# Patient Record
Sex: FEMALE | Race: WHITE | NOT HISPANIC OR LATINO | Employment: FULL TIME | URBAN - METROPOLITAN AREA
[De-identification: names, ages, dates, MRNs, and addresses within clinical notes are randomized per-mention and may not be internally consistent; named-entity substitution may affect disease eponyms.]

---

## 2017-09-29 ENCOUNTER — OFFICE VISIT (OUTPATIENT)
Dept: OBSTETRICS AND GYNECOLOGY | Facility: CLINIC | Age: 32
End: 2017-09-29

## 2017-09-29 VITALS
SYSTOLIC BLOOD PRESSURE: 106 MMHG | WEIGHT: 152 LBS | RESPIRATION RATE: 14 BRPM | HEIGHT: 64 IN | DIASTOLIC BLOOD PRESSURE: 64 MMHG | BODY MASS INDEX: 25.95 KG/M2

## 2017-09-29 DIAGNOSIS — N97.9 PRIMARY FEMALE INFERTILITY: ICD-10-CM

## 2017-09-29 DIAGNOSIS — Z01.419 WELL WOMAN EXAM WITH ROUTINE GYNECOLOGICAL EXAM: Primary | ICD-10-CM

## 2017-09-29 PROCEDURE — 99395 PREV VISIT EST AGE 18-39: CPT | Performed by: OBSTETRICS & GYNECOLOGY

## 2017-09-29 NOTE — PROGRESS NOTES
Subjective   Chief Complaint   Patient presents with   • Gynecologic Exam     Shannan Correa is a 32 y.o. year old  presenting to be seen for her annual exam.     SEXUAL Hx:  She is currently sexually active.  In the past year there has not been new sexual partners.    Condoms are never used.  She would not like to be screened for STD's at today's exam.  Current birth control method: not using any form of contraception because she is currently trying to conceive.  She is happy with her current method of contraception and does not want to discuss alternative methods of contraception.  MENSTRUAL Hx:  Patient's last menstrual period was 2017 (exact date).  In the past 6 months her cycles have been regular, predictable and occur monthly.  Her menstrual flow is typically normal.   Each month on average there are roughly 0 day(s) of very heavy flow.    Intermenstrual bleeding is absent.    Post-coital bleeding is absent.  Dysmenorrhea: Every other month is painful  PMS: is not affecting her activities of daily living  Her cycles are not a source of concern for her that she wishes to discuss today.  HEALTH Hx:  She exercises regularly: yes.  She wears her seat belt: yes.  She has concerns about domestic violence: no.  OTHER THINGS SHE WANTS TO DISCUSS TODAY:  Having trouble conceiving.  Has been trying for more than a year.  Is using the ovulation predictor.  It appears she is ovulating.    The following portions of the patient's history were reviewed and updated as appropriate:problem list, current medications, allergies, past family history, past medical history, past social history and past surgical history.    Smoking status: Never Smoker                                                              Smokeless status: Never Used                        Review of Systems  Constitutional POS: nothing reported    NEG: anorexia or night sweats   Gastointestinal POS: nothing reported    NEG: bloating, change in  "bowel habits, melena or reflux symptoms   Genitourinary POS: nothing reported    NEG: dysuria or hematuria   Integument POS: nothing reported    NEG: moles that are changing in size, shape, color or rashes   Breast POS: nothing reported    NEG: persistent breast lump, skin dimpling or nipple discharge        Objective   /64  Resp 14  Ht 64\" (162.6 cm)  Wt 152 lb (68.9 kg)  LMP 09/21/2017 (Exact Date)  Breastfeeding? No  BMI 26.09 kg/m2    General:  well developed; well nourished  no acute distress   Skin:  No suspicious lesions seen   Thyroid: normal to inspection and palpation   Breasts:  Examined in supine position  Symmetric without masses or skin dimpling  Nipples normal without inversion, lesions or discharge  There are no palpable axillary nodes   Abdomen: soft, non-tender; no masses  no umbilical or inginual hernias are present  no hepato-splenomegaly   Pelvis: Clinical staff was present for exam  External genitalia:  normal appearance of the external genitalia including Bartholin's and [place]ner[place]'s glands.  :  urethral meatus normal;  Vaginal:  normal pink mucosa without prolapse or lesions.  Cervix:  normal appearance.  Uterus:  normal size, shape and consistency. anteverted;  Adnexa:  normal bimanual exam of the adnexa.  Rectal:  digital rectal exam not performed; anus visually normal appearing.        Assessment   1. Normal GYN exam  2. Primary infertility  3. Preconceptional currently on folic acid     Plan   1. Pap was done today.  If she does not receive the results of the Pap within 2 weeks  time, she was instructed to call to find out the results.  I explained to Shannan that the recommendations for Pap smear interval in a low risk patient's has lengthened to 3 years time.  I encouraged her to be seen yearly for a full physical exam including breast and pelvic exam even during the off years when PAP's will not be performed.  2. The following tests were ordered today: PRL, TSH SA for spouse and " day 21 progesterone.  It was explained to Shannan that all lab test should be back within the one week after they are performed. She will be notified about the results, regardless of the findings. If she has not been contacted by the office within 2 weeks after the test has been performed, it is her responsibility to contact us to learn about her results.  3. No prescription was given or electronically sent at today's visit  4. The importance of keeping all planned follow-up and taking all medications as prescribed was emphasized.  5. Follow up after tests to discuss treatment options or next steps in the work-up         This note was electronically signed.    Sudheer Hamlin M.D.  September 29, 2017    Note: Speech recognition transcription software may have been used to create portions of this document.  An attempt at proofreading has been made but errors in transcription could still be present.

## 2017-11-27 ENCOUNTER — HOSPITAL ENCOUNTER (OUTPATIENT)
Dept: GENERAL RADIOLOGY | Facility: HOSPITAL | Age: 32
Discharge: HOME | End: 2017-11-27
Admitting: NURSE PRACTITIONER

## 2017-11-27 ENCOUNTER — TRANSCRIBE ORDERS (OUTPATIENT)
Dept: ADMINISTRATIVE | Facility: HOSPITAL | Age: 32
End: 2017-11-27

## 2017-11-27 DIAGNOSIS — J20.9 BRONCHITIS WITH BRONCHOSPASM: Primary | ICD-10-CM

## 2017-11-27 PROCEDURE — 71020 HC CHEST PA AND LATERAL: CPT

## 2018-08-03 ENCOUNTER — TELEPHONE (OUTPATIENT)
Dept: OBSTETRICS AND GYNECOLOGY | Facility: CLINIC | Age: 33
End: 2018-08-03

## 2018-08-03 RX ORDER — NORGESTIMATE AND ETHINYL ESTRADIOL 7DAYSX3 LO
1 KIT ORAL DAILY
Qty: 28 TABLET | Refills: 3 | Status: SHIPPED | OUTPATIENT
Start: 2018-08-03 | End: 2018-10-05

## 2018-08-03 NOTE — TELEPHONE ENCOUNTER
lorna Weaver wants to go back on birth control. Been off for two years.   says info on bc should be in chart.      lindsey joshua [facility]

## 2018-08-03 NOTE — TELEPHONE ENCOUNTER
Please let her know the prescription has been sent in with refills sufficient to get her to her appointment in October.    New Medications Ordered This Visit   Medications   • norgestimate-ethinyl estradiol (ORTHO TRI-CYCLEN LO) 0.18/0.215/0.25 MG-25 MCG per tablet     Sig: Take 1 tablet by mouth Daily.     Dispense:  28 tablet     Refill:  3

## 2018-10-05 ENCOUNTER — OFFICE VISIT (OUTPATIENT)
Dept: OBSTETRICS AND GYNECOLOGY | Facility: CLINIC | Age: 33
End: 2018-10-05

## 2018-10-05 VITALS
BODY MASS INDEX: 27.12 KG/M2 | SYSTOLIC BLOOD PRESSURE: 112 MMHG | WEIGHT: 158 LBS | DIASTOLIC BLOOD PRESSURE: 64 MMHG | RESPIRATION RATE: 14 BRPM

## 2018-10-05 DIAGNOSIS — Z01.419 WELL WOMAN EXAM WITH ROUTINE GYNECOLOGICAL EXAM: Primary | ICD-10-CM

## 2018-10-05 PROCEDURE — 99395 PREV VISIT EST AGE 18-39: CPT | Performed by: OBSTETRICS & GYNECOLOGY

## 2018-10-05 RX ORDER — NORGESTIMATE AND ETHINYL ESTRADIOL 7DAYSX3 LO
1 KIT ORAL DAILY
Qty: 84 TABLET | Refills: 4 | Status: SHIPPED | OUTPATIENT
Start: 2018-10-05 | End: 2018-10-05

## 2018-10-05 RX ORDER — NORGESTIMATE AND ETHINYL ESTRADIOL 7DAYSX3 28
1 KIT ORAL DAILY
Qty: 84 TABLET | Refills: 4 | Status: SHIPPED | OUTPATIENT
Start: 2018-10-05 | End: 2018-10-29 | Stop reason: SDUPTHER

## 2018-10-05 NOTE — PROGRESS NOTES
Subjective   Chief Complaint   Patient presents with   • Gynecologic Exam     Shannan Correa is a 33 y.o. year old  presenting to be seen for her annual exam. Getting .  Current BC now ~ $30.  Wonders if able to find one less expensive    SEXUAL Hx:  She is currently sexually active.  In the past year there there has been ONE new sexual partner.    Condoms are always used.  She would not like to be screened for STD's at today's exam.  Current birth control method: condoms and OCP (estrogen/progesterone).  She is happy with her current method of contraception and does not want to discuss alternative methods of contraception.  MENSTRUAL Hx:  Patient's last menstrual period was 2018 (approximate).   Back on BCP's ~ 2 months now  In the past 6 months her cycles have been regular, predictable and occur monthly.  Her menstrual flow is typically normal.   Each month on average there are roughly 0 day(s) of very heavy flow.    Intermenstrual bleeding is absent.    Post-coital bleeding is absent.  Dysmenorrhea: is not affecting her activities of daily living  PMS: is not affecting her activities of daily living  Her cycles are not a source of concern for her that she wishes to discuss today.  HEALTH Hx:  She exercises regularly: yes.  She wears her seat belt: yes.  She has concerns about domestic violence: no.  OTHER THINGS SHE WANTS TO DISCUSS TODAY:  Nothing else    The following portions of the patient's history were reviewed and updated as appropriate:problem list, current medications, allergies, past family history, past medical history, past social history and past surgical history.    Smoking status: Never Smoker                                                              Smokeless tobacco: Never Used                        Review of Systems  Constitutional POS: nothing reported    NEG: anorexia or night sweats   Genitourinary POS: nothing reported    NEG: dysuria or hematuria      Gastointestinal  POS: nothing reported    NEG: bloating, change in bowel habits, melena or reflux symptoms   Integument POS: nothing reported    NEG: moles that are changing in size, shape, color or rashes   Breast POS: nothing reported    NEG: persistent breast lump, skin dimpling or nipple discharge        Objective   /64   Resp 14   Wt 71.7 kg (158 lb)   LMP 09/19/2018 (Approximate)   Breastfeeding? No   BMI 27.12 kg/m²     General:  well developed; well nourished  no acute distress   Skin:  No suspicious lesions seen   Thyroid: normal to inspection and palpation   Breasts:  Examined in supine position  Symmetric without masses or skin dimpling  Nipples normal without inversion, lesions or discharge  There are no palpable axillary nodes   Abdomen: soft, non-tender; no masses  no umbilical or inginual hernias are present  no hepato-splenomegaly   Pelvis: Clinical staff was present for exam  External genitalia:  normal appearance of the external genitalia including Bartholin's and Sparks's glands.  :  urethral meatus normal;  Vaginal:  normal pink mucosa without prolapse or lesions.  Cervix:  normal appearance.  Uterus:  normal size, shape and consistency.  Adnexa:  normal bimanual exam of the adnexa.  Rectal:  digital rectal exam not performed; anus visually normal appearing.        Assessment   1. Normal GYN exam     Plan   1. Pap was not done today.  I explained to Shannan that the recommendations for Pap smear interval in a low risk patient has lengthened to 3 years time.  I told Shannan she still needs to be seen in our office yearly for a full physical including breast and pelvic exam.  2. The importance of keeping all planned follow-up and taking all medications as prescribed was emphasized.  3. Follow up for annual exam 1 year    New Medications Ordered This Visit   Medications   • norgestimate-ethinyl estradiol (ORTHO TRI-CYCLEN, 28,) 0.18/0.215/0.25 MG-35 MCG per tablet     Sig: Take 1 tablet by mouth Daily.      Dispense:  84 tablet     Refill:  4          This note was electronically signed.    Sudheer Hamlin M.D.  October 5, 2018    Note: Speech recognition transcription software may have been used to create portions of this document.  An attempt at proofreading has been made but errors in transcription could still be present.

## 2018-10-26 ENCOUNTER — TELEPHONE (OUTPATIENT)
Dept: OBSTETRICS AND GYNECOLOGY | Facility: CLINIC | Age: 33
End: 2018-10-26

## 2018-10-26 NOTE — TELEPHONE ENCOUNTER
lorna      Pt says her rx's from last visit still didn't make it to pharmacy. The generic ortho tricyclen lo, and a few others.    lindsey chen    Please let pt know when they are sent back to pharmacy

## 2018-10-29 RX ORDER — NORGESTIMATE AND ETHINYL ESTRADIOL 7DAYSX3 28
1 KIT ORAL DAILY
Qty: 84 TABLET | Refills: 4 | Status: SHIPPED | OUTPATIENT
Start: 2018-10-29 | End: 2019-10-11 | Stop reason: SDUPTHER

## 2018-11-23 PROCEDURE — 87086 URINE CULTURE/COLONY COUNT: CPT | Performed by: FAMILY MEDICINE

## 2018-11-23 PROCEDURE — 87186 SC STD MICRODIL/AGAR DIL: CPT | Performed by: FAMILY MEDICINE

## 2018-11-23 PROCEDURE — 87077 CULTURE AEROBIC IDENTIFY: CPT | Performed by: FAMILY MEDICINE

## 2018-11-25 ENCOUNTER — TELEPHONE (OUTPATIENT)
Dept: URGENT CARE | Facility: CLINIC | Age: 33
End: 2018-11-25

## 2018-11-25 DIAGNOSIS — N39.0 URINARY TRACT INFECTION WITHOUT HEMATURIA, SITE UNSPECIFIED: ICD-10-CM

## 2018-11-25 DIAGNOSIS — J20.9 ACUTE BRONCHITIS, UNSPECIFIED ORGANISM: ICD-10-CM

## 2018-11-25 RX ORDER — NITROFURANTOIN 25; 75 MG/1; MG/1
100 CAPSULE ORAL 2 TIMES DAILY
Qty: 14 CAPSULE | Refills: 0 | Status: SHIPPED | OUTPATIENT
Start: 2018-11-25 | End: 2019-01-28

## 2018-11-25 NOTE — TELEPHONE ENCOUNTER
Informed patient new medication sent to her pharmacy.  Patient is going today to  new prescription.  Advised to follow up with pcp or us after finishing new medication and symptoms still persist.  FF 11/25/18

## 2019-01-04 ENCOUNTER — TELEPHONE (OUTPATIENT)
Dept: INTERNAL MEDICINE | Facility: CLINIC | Age: 34
End: 2019-01-04

## 2019-01-04 NOTE — TELEPHONE ENCOUNTER
Called to follow up on BP. We added hctz 12.5mg on 12/27/18 to losartan 25 mg.   Bp Readings :   today: 145/97  Yesterday 150/98  01/02 133/98  01/01 142/111 142/93

## 2019-01-07 ENCOUNTER — TELEPHONE (OUTPATIENT)
Dept: INTERNAL MEDICINE | Facility: CLINIC | Age: 34
End: 2019-01-07

## 2019-01-07 RX ORDER — ALBUTEROL SULFATE 90 UG/1
2 AEROSOL, METERED RESPIRATORY (INHALATION) EVERY 4 HOURS PRN
Qty: 1 INHALER | Refills: 0 | Status: SHIPPED | OUTPATIENT
Start: 2019-01-07 | End: 2019-02-19 | Stop reason: SDUPTHER

## 2019-01-11 ENCOUNTER — TELEPHONE (OUTPATIENT)
Dept: INTERNAL MEDICINE | Facility: CLINIC | Age: 34
End: 2019-01-11

## 2019-01-11 RX ORDER — LOSARTAN POTASSIUM AND HYDROCHLOROTHIAZIDE 12.5; 5 MG/1; MG/1
1 TABLET ORAL DAILY
Qty: 30 TABLET | Refills: 2 | Status: SHIPPED | OUTPATIENT
Start: 2019-01-11 | End: 2019-01-28 | Stop reason: ALTCHOICE

## 2019-01-28 ENCOUNTER — OFFICE VISIT (OUTPATIENT)
Dept: INTERNAL MEDICINE | Facility: CLINIC | Age: 34
End: 2019-01-28

## 2019-01-28 VITALS
HEART RATE: 80 BPM | WEIGHT: 157.7 LBS | DIASTOLIC BLOOD PRESSURE: 68 MMHG | BODY MASS INDEX: 26.92 KG/M2 | SYSTOLIC BLOOD PRESSURE: 148 MMHG | HEIGHT: 64 IN

## 2019-01-28 DIAGNOSIS — Z13.220 LIPID SCREENING: ICD-10-CM

## 2019-01-28 DIAGNOSIS — I10 BENIGN ESSENTIAL HYPERTENSION: Primary | ICD-10-CM

## 2019-01-28 DIAGNOSIS — R53.83 FATIGUE, UNSPECIFIED TYPE: ICD-10-CM

## 2019-01-28 DIAGNOSIS — Z13.29 THYROID DISORDER SCREENING: ICD-10-CM

## 2019-01-28 PROCEDURE — 99214 OFFICE O/P EST MOD 30 MIN: CPT | Performed by: NURSE PRACTITIONER

## 2019-01-28 RX ORDER — HYDROCHLOROTHIAZIDE 25 MG/1
25 TABLET ORAL DAILY
Qty: 30 TABLET | Refills: 2 | Status: SHIPPED | OUTPATIENT
Start: 2019-01-28 | End: 2019-08-04 | Stop reason: SDUPTHER

## 2019-01-28 NOTE — PATIENT INSTRUCTIONS
Problem List Items Addressed This Visit        Cardiovascular and Mediastinum    Benign essential hypertension - Primary     Stop losartan/hctz and take hctz 25 mg daily am starting tomorrow.    Continue medications as prescribed.  Check BP at home and keep a log for your next visit.    •In general, adults should engage in aerobic physical activity 3-4 times a week with each session lasting an average of 40 minutes.  Goal for 150 minutes per week total.  •Moderate (brisk walking or jogging) to vigorous (running or biking) physical activity is recommended.  •There are many helpful strategies for heart-healthy eating, including the DASH diet and the [employer]'s Choose My Plate.   •Patients with high blood pressure should consume no more than 2,400 mg of sodium a day, ideally reducing sodium intake to 1,500 mg a day. However, even reducing sodium intake in one's current diet by 1,000 mg each day can help lower blood pressure.    Limit alcohol consumption.    Information obtained from the American College of Cardiology and American Heart Association.           Relevant Medications    hydrochlorothiazide (HYDRODIURIL) 25 MG tablet    Other Relevant Orders    Comprehensive Metabolic Panel    CBC & Differential    Microalbumin / Creatinine Urine Ratio - Urine, Clean Catch    US Renal Bilateral      Other Visit Diagnoses     Thyroid disorder screening        Relevant Orders    TSH Rfx On Abnormal To Free T4    Lipid screening        Relevant Orders    Lipid Panel    Fatigue, unspecified type        Relevant Orders    Vitamin B12    Vitamin D 25 Hydroxy

## 2019-01-28 NOTE — PROGRESS NOTES
Shannan Correa  1985  [id number]  Patient Care Team:  Darryl Thompson MD as PCP - Internal Medicine (Internal Medicine)  Sudheer Hamlin MD as Obstetrician (Obstetrics and Gynecology)    Shannan Correa is a pleasant 34 y.o. female who presents for evaluation of Hypertension      Chief Complaint   Patient presents with   • Hypertension       HPI:   F/U HTN:  Having HA's since last change in BP meds, has increaseed hydration thinking it was dehydration, bilat temporal, improves with Advil but trying to avoid daily use.  bp some better 140-150/80-90 avg.  Gained 5-7# since med change.  Inc. Stress with work and divorce this past yr, no inc. Etoh, sig change in diet    Past Medical History:   Diagnosis Date   • Hypertension        Past Surgical History:   Procedure Laterality Date   • LAPAROSCOPIC CHOLECYSTECTOMY  2009       Family History   Problem Relation Age of Onset   • Breast cancer Cousin         maternal 1st cousin once removed   • Diabetes Mother    • Hypertension Mother    • Hypertension Father    • Coronary artery disease Maternal Grandmother    • Alzheimer's disease Maternal Grandmother    • Lung cancer Paternal Grandfather        Social History     Tobacco Use   Smoking Status Never Smoker   Smokeless Tobacco Never Used       Allergies   Allergen Reactions   • Erythromycin        Review of Systems   Constitutional: Negative for chills, fatigue and fever.   HENT: Negative for congestion, ear pain and sinus pressure.    Respiratory: Negative for cough, chest tightness, shortness of breath and wheezing.    Cardiovascular: Negative for chest pain and palpitations.   Gastrointestinal: Negative for abdominal pain, blood in stool and constipation.   Skin: Negative for color change.   Allergic/Immunologic: Negative for environmental allergies.   Neurological: Negative for dizziness, speech difficulty and headache.   Psychiatric/Behavioral: Negative for decreased concentration. The patient is not  "nervous/anxious.        Vitals:    01/28/19 1415   BP: 148/68   BP Location: Left arm   Patient Position: Sitting   Cuff Size: Adult   Pulse: 80   Weight: 71.5 kg (157 lb 11.2 oz)   Height: 162.6 cm (64.02\")         Current Outpatient Medications:   •  albuterol sulfate  (90 Base) MCG/ACT inhaler, Inhale 2 puffs Every 4 (Four) Hours As Needed for Wheezing., Disp: 1 inhaler, Rfl: 0  •  Multiple Vitamin (DAILY VITAMIN) tablet, Take 1 tablet by mouth daily., Disp: , Rfl:   •  norgestimate-ethinyl estradiol (ORTHO TRI-CYCLEN, 28,) 0.18/0.215/0.25 MG-35 MCG per tablet, Take 1 tablet by mouth Daily., Disp: 84 tablet, Rfl: 4  •  hydrochlorothiazide (HYDRODIURIL) 25 MG tablet, Take 1 tablet by mouth Daily., Disp: 30 tablet, Rfl: 2    Physical Exam   Constitutional: She appears well-developed and well-nourished.   HENT:   Head: Normocephalic and atraumatic.   Right Ear: External ear normal.   Left Ear: External ear normal.   Mouth/Throat: Oropharynx is clear and moist.   Eyes: Conjunctivae and EOM are normal.   Neck: Normal range of motion. Neck supple.   Cardiovascular: Normal rate, regular rhythm and normal heart sounds.   Pulmonary/Chest: Effort normal and breath sounds normal.   Abdominal: Soft. Bowel sounds are normal.   Musculoskeletal: Normal range of motion.   Lymphadenopathy:     She has no cervical adenopathy.   Neurological: She is alert.   Skin: Skin is warm and dry.   Psychiatric: She has a normal mood and affect. Her behavior is normal. Thought content normal.       Results Review:    None      Assessment/Plan:    Problem List Items Addressed This Visit        Cardiovascular and Mediastinum    Benign essential hypertension - Primary    Current Assessment & Plan     Stop losartan/hctz and take hctz 25 mg daily am starting tomorrow.    Continue medications as prescribed.  Check BP at home and keep a log for your next visit.    •In general, adults should engage in aerobic physical activity 3-4 times a week " with each session lasting an average of 40 minutes.  Goal for 150 minutes per week total.  •Moderate (brisk walking or jogging) to vigorous (running or biking) physical activity is recommended.  •There are many helpful strategies for heart-healthy eating, including the DASH diet and the [employer]'s Choose My Plate.   •Patients with high blood pressure should consume no more than 2,400 mg of sodium a day, ideally reducing sodium intake to 1,500 mg a day. However, even reducing sodium intake in one's current diet by 1,000 mg each day can help lower blood pressure.    Limit alcohol consumption.    Information obtained from the American College of Cardiology and American Heart Association.           Relevant Medications    hydrochlorothiazide (HYDRODIURIL) 25 MG tablet    Other Relevant Orders    Comprehensive Metabolic Panel    CBC & Differential    Microalbumin / Creatinine Urine Ratio - Urine, Clean Catch    US Renal Bilateral      Other Visit Diagnoses     Thyroid disorder screening        Relevant Orders    TSH Rfx On Abnormal To Free T4    Lipid screening        Relevant Orders    Lipid Panel    Fatigue, unspecified type        Relevant Orders    Vitamin B12    Vitamin D 25 Hydroxy          Plan of care reviewed with patient at the conclusion of today's visit. Education was provided regarding diagnosis, management and any prescribed or recommended OTC medications.  Patient verbalizes understanding of and agreement with management plan.    Return in about 1 month (around 2/28/2019) for Recheck.    HUMZA Diaz

## 2019-01-28 NOTE — ASSESSMENT & PLAN NOTE
Stop losartan/hctz and take hctz 25 mg daily am starting tomorrow.    Continue medications as prescribed.  Check BP at home and keep a log for your next visit.    •In general, adults should engage in aerobic physical activity 3-4 times a week with each session lasting an average of 40 minutes.  Goal for 150 minutes per week total.  •Moderate (brisk walking or jogging) to vigorous (running or biking) physical activity is recommended.  •There are many helpful strategies for heart-healthy eating, including the DASH diet and the [employer]'s Choose My Plate.   •Patients with high blood pressure should consume no more than 2,400 mg of sodium a day, ideally reducing sodium intake to 1,500 mg a day. However, even reducing sodium intake in one's current diet by 1,000 mg each day can help lower blood pressure.    Limit alcohol consumption.    Information obtained from the American College of Cardiology and American Heart Association.

## 2019-01-29 ENCOUNTER — TELEPHONE (OUTPATIENT)
Dept: INTERNAL MEDICINE | Facility: CLINIC | Age: 34
End: 2019-01-29

## 2019-01-29 DIAGNOSIS — I10 BENIGN ESSENTIAL HYPERTENSION: Primary | ICD-10-CM

## 2019-01-29 NOTE — TELEPHONE ENCOUNTER
DEPT STATES BASED ON DX THIS SHOULD BE A VAS23.  PLEASE ADD NEW ORDER FOR ULTRASOUND AND CANCEL THE OLD ONE.

## 2019-02-14 ENCOUNTER — TELEPHONE (OUTPATIENT)
Dept: INTERNAL MEDICINE | Facility: CLINIC | Age: 34
End: 2019-02-14

## 2019-02-14 ENCOUNTER — HOSPITAL ENCOUNTER (OUTPATIENT)
Dept: CARDIOLOGY | Facility: HOSPITAL | Age: 34
Discharge: HOME | End: 2019-02-14
Admitting: NURSE PRACTITIONER

## 2019-02-14 VITALS — WEIGHT: 157.63 LBS | BODY MASS INDEX: 26.91 KG/M2 | HEIGHT: 64 IN

## 2019-02-14 DIAGNOSIS — I10 BENIGN ESSENTIAL HYPERTENSION: ICD-10-CM

## 2019-02-14 LAB
BH CV ECHO MEAS - DIST REN A EDV LEFT: 46.2 CM/SEC
BH CV ECHO MEAS - DIST REN A PSV LEFT: 108.7 CM/SEC
BH CV ECHO MEAS - DIST REN A RI LEFT: 0.57
BH CV ECHO MEAS - HILAR A EDV LEFT: 56.4 CM/SEC
BH CV ECHO MEAS - HILAR A PSV LEFT: 129.7 CM/SEC
BH CV ECHO MEAS - HILAR A RI LEFT: 0.57
BH CV ECHO MEAS - MID REN A EDV LEFT: 41.6 CM/SEC
BH CV ECHO MEAS - MID REN A PSV LEFT: 117.3 CM/SEC
BH CV ECHO MEAS - MID REN A RI LEFT: 0.65
BH CV ECHO MEAS - PROX REN A EDV LEFT: 32.8 CM/SEC
BH CV ECHO MEAS - PROX REN A PSV LEFT: 73 CM/SEC
BH CV ECHO MEAS - PROX REN A RI LEFT: 0.55
BH CV VAS BP LEFT ARM: NORMAL MMHG
BH CV VAS RENAL AORTIC MID EDV: 16.3 CM/S
BH CV VAS RENAL AORTIC MID PSV: 125 CM/S
BH CV VAS RENAL HILUM LEFT EDV: 55.7 CM/S
BH CV VAS RENAL HILUM LEFT PSV: 129 CM/S
BH CV VAS RENAL HILUM RIGHT EDV: 40.4 CM/S
BH CV VAS RENAL HILUM RIGHT PSV: 95.7 CM/S
BH CV XCLRA SUP ARC RI LEFT: 0.51
BH CV XLRA MEAS - KID L LEFT: 9.4 CM
BH CV XLRA MEAS - RENAL A ORG RI LEFT: 0.59
BH CV XLRA MEAS - SUP ARC PSV LEFT: 19 CM/SEC
BH CV XLRA MEAS - SUP REN AO EDV: 17 CM/SEC
BH CV XLRA MEAS - SUP REN AO PSV: 125.6 CM/SEC
BH CV XLRA MEAS - SUP REN AO RI: 0.86
BH CV XLRA MEAS - SUP REN AO S/D: 7.4
BH CV XLRA MEAS - SUP SEG EDV LEFT: 15.6 CM/SEC
BH CV XLRA MEAS - SUP SEG PSV LEFT: 40.8 CM/SEC
BH CV XLRA MEAS - SUP SEG RI LEFT: 0.62
BH CV XLRA MEAS DIST REN A EDV RIGHT: 45.5 CM/SEC
BH CV XLRA MEAS DIST REN A PSV RIGHT: 91.4 CM/SEC
BH CV XLRA MEAS DIST REN A RI RIGHT: 0.5
BH CV XLRA MEAS HILAR A EDV RIGHT: -40.9 CM/SEC
BH CV XLRA MEAS HILAR A PSV RIGHT: -96.2 CM/SEC
BH CV XLRA MEAS HILAR A RI RIGHT: 0.58
BH CV XLRA MEAS INF ARC EDV LEFT: 10.8 CM/SEC
BH CV XLRA MEAS INF ARC EDV RIGHT: 13 CM/SEC
BH CV XLRA MEAS INF ARC PSV LEFT: 22.9 CM/SEC
BH CV XLRA MEAS INF ARC PSV RIGHT: 24.5 CM/SEC
BH CV XLRA MEAS INF ARC RI LEFT: 0.53
BH CV XLRA MEAS INF ARC RI RIGHT: 0.47
BH CV XLRA MEAS INF SEG EDV LEFT: 21.7 CM/SEC
BH CV XLRA MEAS INF SEG EDV RIGHT: 25.4 CM/SEC
BH CV XLRA MEAS INF SEG PSV LEFT: 44.7 CM/SEC
BH CV XLRA MEAS INF SEG PSV RIGHT: 63.4 CM/SEC
BH CV XLRA MEAS INF SEG RI LEFT: 0.51
BH CV XLRA MEAS INF SEG RI RIGHT: 0.6
BH CV XLRA MEAS KID L RIGHT: 9.6 CM
BH CV XLRA MEAS MID REN A EDV RIGHT: 42 CM/SEC
BH CV XLRA MEAS MID REN A PSV RIGHT: 89 CM/SEC
BH CV XLRA MEAS MID REN A RI RIGHT: 0.53
BH CV XLRA MEAS PROX REN A EDV RIGHT: 39.7 CM/SEC
BH CV XLRA MEAS PROX REN A PSV RIGHT: 81.2 CM/SEC
BH CV XLRA MEAS PROX REN A RI RIGHT: 0.51
BH CV XLRA MEAS RAR LEFT: 0.94
BH CV XLRA MEAS RAR RIGHT: 0.73
BH CV XLRA MEAS RENAL A ORG EDV LEFT: 18.8 CM/SEC
BH CV XLRA MEAS RENAL A ORG EDV RIGHT: 19.3 CM/SEC
BH CV XLRA MEAS RENAL A ORG PSV LEFT: 45.7 CM/SEC
BH CV XLRA MEAS RENAL A ORG PSV RIGHT: 46.8 CM/SEC
BH CV XLRA MEAS RENAL A ORG RI RIGHT: 0.59
BH CV XLRA MEAS SUP ARC EDV RIGHT: 13.5 CM/SEC
BH CV XLRA MEAS SUP ARC PSV RIGHT: 28.9 CM/SEC
BH CV XLRA MEAS SUP ARC RI RIGHT: 0.53
BH CV XLRA MEAS SUP SEG EDV RIGHT: 25.1 CM/SEC
BH CV XLRA MEAS SUP SEG PSV RIGHT: 63.1 CM/SEC
BH CV XLRA MEAS SUP SEG RI RIGHT: 0.6
BH CV XLRA SUP ARC EDV LEFT: 9.3 CM/SEC
LEFT RENAL UPPER PARENCHYMA MAX: 22.7 CM/S
LEFT RENAL UPPER PARENCHYMA MIN: 10.7 CM/S
LEFT RENAL UPPER PARENCHYMA RI: 0.53
RIGHT RENAL UPPER PARENCHYMA MAX: 28.7 CM/S
RIGHT RENAL UPPER PARENCHYMA MIN: 13.3 CM/S
RIGHT RENAL UPPER PARENCHYMA RI: 0.54

## 2019-02-14 PROCEDURE — 93975 VASCULAR STUDY: CPT

## 2019-02-15 NOTE — TELEPHONE ENCOUNTER
Pt notified and verbalized understanding. States her BP is staying around the same 140s/100s and that she will get to the blood work early next week.

## 2019-02-18 ENCOUNTER — TELEPHONE (OUTPATIENT)
Dept: INTERNAL MEDICINE | Facility: CLINIC | Age: 34
End: 2019-02-18

## 2019-02-19 RX ORDER — LISINOPRIL 5 MG/1
5 TABLET ORAL DAILY
Qty: 30 TABLET | Refills: 1 | Status: SHIPPED | OUTPATIENT
Start: 2019-02-19 | End: 2019-02-27 | Stop reason: SINTOL

## 2019-02-19 RX ORDER — ALBUTEROL SULFATE 90 UG/1
2 AEROSOL, METERED RESPIRATORY (INHALATION) EVERY 4 HOURS PRN
Qty: 1 INHALER | Refills: 0 | Status: SHIPPED | OUTPATIENT
Start: 2019-02-19 | End: 2019-05-01 | Stop reason: SDUPTHER

## 2019-02-19 NOTE — TELEPHONE ENCOUNTER
Still too high.  We need to try adding another med.  She felt tired on losartan.  Have her continue hctz 25 mg and will try lisinopril 5 mg/day.  If needed will increase to 10 mg in 3 weeks.

## 2019-02-20 ENCOUNTER — CLINICAL SUPPORT (OUTPATIENT)
Dept: INTERNAL MEDICINE | Facility: CLINIC | Age: 34
End: 2019-02-20

## 2019-02-20 DIAGNOSIS — R53.83 FATIGUE, UNSPECIFIED TYPE: ICD-10-CM

## 2019-02-20 DIAGNOSIS — Z13.220 LIPID SCREENING: ICD-10-CM

## 2019-02-20 DIAGNOSIS — Z13.29 THYROID DISORDER SCREENING: ICD-10-CM

## 2019-02-20 DIAGNOSIS — I10 BENIGN ESSENTIAL HYPERTENSION: ICD-10-CM

## 2019-02-20 LAB
25(OH)D3 SERPL-MCNC: 25.4 NG/ML
ALBUMIN SERPL-MCNC: 4.35 G/DL (ref 3.2–4.8)
ALBUMIN/GLOB SERPL: 1.9 G/DL (ref 1.5–2.5)
ALP SERPL-CCNC: 39 U/L (ref 25–100)
ALT SERPL W P-5'-P-CCNC: 12 U/L (ref 7–40)
ANION GAP SERPL CALCULATED.3IONS-SCNC: 10 MMOL/L (ref 3–11)
ARTICHOKE IGE QN: 139 MG/DL (ref 0–130)
AST SERPL-CCNC: 15 U/L (ref 0–33)
BASOPHILS # BLD AUTO: 0.05 10*3/MM3 (ref 0–0.2)
BASOPHILS NFR BLD AUTO: 0.5 % (ref 0–1)
BILIRUB SERPL-MCNC: 0.5 MG/DL (ref 0.3–1.2)
BUN BLD-MCNC: 11 MG/DL (ref 9–23)
BUN/CREAT SERPL: 14.9 (ref 7–25)
CALCIUM SPEC-SCNC: 9.7 MG/DL (ref 8.7–10.4)
CHLORIDE SERPL-SCNC: 105 MMOL/L (ref 99–109)
CHOLEST SERPL-MCNC: 220 MG/DL (ref 0–200)
CO2 SERPL-SCNC: 24 MMOL/L (ref 20–31)
CREAT BLD-MCNC: 0.74 MG/DL (ref 0.6–1.3)
DEPRECATED RDW RBC AUTO: 44.7 FL (ref 37–54)
EOSINOPHIL # BLD AUTO: 0.87 10*3/MM3 (ref 0–0.3)
EOSINOPHIL NFR BLD AUTO: 8 % (ref 0–3)
ERYTHROCYTE [DISTWIDTH] IN BLOOD BY AUTOMATED COUNT: 13.4 % (ref 11.3–14.5)
GFR SERPL CREATININE-BSD FRML MDRD: 90 ML/MIN/1.73
GLOBULIN UR ELPH-MCNC: 2.4 GM/DL
GLUCOSE BLD-MCNC: 87 MG/DL (ref 70–100)
HCT VFR BLD AUTO: 44.6 % (ref 34.5–44)
HDLC SERPL-MCNC: 64 MG/DL (ref 40–60)
HGB BLD-MCNC: 14.9 G/DL (ref 11.5–15.5)
IMM GRANULOCYTES # BLD AUTO: 0.02 10*3/MM3 (ref 0–0.05)
IMM GRANULOCYTES NFR BLD AUTO: 0.2 % (ref 0–0.6)
LYMPHOCYTES # BLD AUTO: 4.17 10*3/MM3 (ref 0.6–4.8)
LYMPHOCYTES NFR BLD AUTO: 38.1 % (ref 24–44)
MCH RBC QN AUTO: 30.3 PG (ref 27–31)
MCHC RBC AUTO-ENTMCNC: 33.4 G/DL (ref 32–36)
MCV RBC AUTO: 90.8 FL (ref 80–99)
MONOCYTES # BLD AUTO: 0.47 10*3/MM3 (ref 0–1)
MONOCYTES NFR BLD AUTO: 4.3 % (ref 0–12)
NEUTROPHILS # BLD AUTO: 5.36 10*3/MM3 (ref 1.5–8.3)
NEUTROPHILS NFR BLD AUTO: 48.9 % (ref 41–71)
PLATELET # BLD AUTO: 347 10*3/MM3 (ref 150–450)
PMV BLD AUTO: 9.5 FL (ref 6–12)
POTASSIUM BLD-SCNC: 4.2 MMOL/L (ref 3.5–5.5)
PROT SERPL-MCNC: 6.7 G/DL (ref 5.7–8.2)
RBC # BLD AUTO: 4.91 10*6/MM3 (ref 3.89–5.14)
SODIUM BLD-SCNC: 139 MMOL/L (ref 132–146)
TRIGL SERPL-MCNC: 197 MG/DL (ref 0–150)
TSH SERPL DL<=0.05 MIU/L-ACNC: 2.78 MIU/ML (ref 0.35–5.35)
VIT B12 BLD-MCNC: 367 PG/ML (ref 211–911)
WBC NRBC COR # BLD: 10.94 10*3/MM3 (ref 3.5–10.8)

## 2019-02-20 PROCEDURE — 85025 COMPLETE CBC W/AUTO DIFF WBC: CPT | Performed by: NURSE PRACTITIONER

## 2019-02-20 PROCEDURE — 80053 COMPREHEN METABOLIC PANEL: CPT | Performed by: NURSE PRACTITIONER

## 2019-02-20 PROCEDURE — 80061 LIPID PANEL: CPT | Performed by: NURSE PRACTITIONER

## 2019-02-20 PROCEDURE — 84443 ASSAY THYROID STIM HORMONE: CPT | Performed by: NURSE PRACTITIONER

## 2019-02-20 PROCEDURE — 82607 VITAMIN B-12: CPT | Performed by: NURSE PRACTITIONER

## 2019-02-20 PROCEDURE — 36415 COLL VENOUS BLD VENIPUNCTURE: CPT | Performed by: NURSE PRACTITIONER

## 2019-02-20 PROCEDURE — 82043 UR ALBUMIN QUANTITATIVE: CPT | Performed by: NURSE PRACTITIONER

## 2019-02-20 PROCEDURE — 82306 VITAMIN D 25 HYDROXY: CPT | Performed by: NURSE PRACTITIONER

## 2019-02-20 PROCEDURE — 82570 ASSAY OF URINE CREATININE: CPT | Performed by: NURSE PRACTITIONER

## 2019-02-21 LAB
CREAT 24H UR-MCNC: 216.2 MG/DL
MICROALBUMIN UR-MCNC: 7.6 UG/ML
MICROALBUMIN/CREAT UR: 3.5 MG/G CREAT (ref 0–30)

## 2019-02-22 PROBLEM — N39.0 ACUTE URINARY TRACT INFECTION: Status: ACTIVE | Noted: 2019-02-22

## 2019-02-22 PROBLEM — J40 BRONCHITIS: Status: ACTIVE | Noted: 2019-02-22

## 2019-02-23 PROBLEM — E78.2 MIXED HYPERLIPIDEMIA: Status: ACTIVE | Noted: 2019-02-23

## 2019-02-27 ENCOUNTER — OFFICE VISIT (OUTPATIENT)
Dept: INTERNAL MEDICINE | Facility: CLINIC | Age: 34
End: 2019-02-27

## 2019-02-27 VITALS
BODY MASS INDEX: 27.31 KG/M2 | WEIGHT: 160 LBS | HEIGHT: 64 IN | TEMPERATURE: 97.2 F | HEART RATE: 84 BPM | SYSTOLIC BLOOD PRESSURE: 118 MMHG | DIASTOLIC BLOOD PRESSURE: 94 MMHG

## 2019-02-27 DIAGNOSIS — J45.21 MILD INTERMITTENT ASTHMATIC BRONCHITIS WITH ACUTE EXACERBATION: Primary | ICD-10-CM

## 2019-02-27 DIAGNOSIS — I10 BENIGN ESSENTIAL HYPERTENSION: ICD-10-CM

## 2019-02-27 PROCEDURE — 99213 OFFICE O/P EST LOW 20 MIN: CPT | Performed by: NURSE PRACTITIONER

## 2019-02-27 NOTE — PROGRESS NOTES
Shannan Sharp Answers for HPI/ROS submitted by the patient on 2/25/2019   Shortness of breath  Chronicity: new  Onset: 1 to 4 weeks ago  Frequency: daily  Progression since onset: gradually worsening  Episode duration: 15 minutes  claudication: No  coryza: Yes  headaches: No  hemoptysis: No  leg pain: No  orthopnea: Yes  PND: Yes  sputum production: Yes  syncope: No  Aggravating factors: lying flat    1985  [id number]  Patient Care Team:  Darryl Thompson MD as PCP - Internal Medicine (Internal Medicine)  Sudheer Hamlin MD as Obstetrician (Obstetrics and Gynecology)    Shannan Sharp is a pleasant 34 y.o. female who presents for evaluation of Follow-up (on medications and blood pressure) and Wheezing (and productive cough with clear to yellow/green sputum )      Chief Complaint   Patient presents with   • Follow-up     on medications and blood pressure   • Wheezing     and productive cough with clear to yellow/green sputum        HPI:   Ms. Sharp is a 34-year-old female who presents today for a follow up.  She has been seen multiple visits for her blood pressure control.  She was most recently taken off her Lisinopril 5mg by mouth daily for intolerance (fatigue) and her HCTZ was increased to 25mg by mouth daily.  She states she has tolerated that well so far, with exception of increased urination.  She states that she stays well hydrated during the day by drinking water.  She has also been keeping daily logs of her blood pressure and recorded a blood pressure of 124/82 a few days ago.  She states it is ranging from about 120's systolic and 80-90's diastolic.  She denies any dizziness.  Her main complaint for being seen today is a cough, tightness in her chest with occasional wheezing and shortness of breath for about 3 weeks now.  She denies fever.  She states she has been using her rescue inhaler (Ventolin) every four hours for about three weeks now and is even using it during  "the night when she wakes up with the tightness and coughing in her sleep.  She states she gets immediate relief from the inhaler use but \"it doesn't seem to last\".  She states her cough is worse in the morning.  She denies nasal congestion or ear pain.  Denies headache.  She states she actually feels very \"dry\".  She also states she has been taking Mucinex OTC for her cough and congestion.      Past Medical History:   Diagnosis Date   • Hypertension        Past Surgical History:   Procedure Laterality Date   • LAPAROSCOPIC CHOLECYSTECTOMY  2009       Family History   Problem Relation Age of Onset   • Breast cancer Cousin         maternal 1st cousin once removed   • Diabetes Mother    • Hypertension Mother    • Hypertension Father    • Coronary artery disease Maternal Grandmother    • Alzheimer's disease Maternal Grandmother    • Lung cancer Paternal Grandfather        Social History     Tobacco Use   Smoking Status Never Smoker   Smokeless Tobacco Never Used       Allergies   Allergen Reactions   • Erythromycin        Review of Systems   Constitutional: Negative for chills, fatigue and fever.   HENT: Positive for congestion. Negative for ear pain and sinus pressure.    Respiratory: Positive for cough, chest tightness, shortness of breath and wheezing.    Cardiovascular: Negative for palpitations.   Gastrointestinal: Negative for blood in stool and constipation.   Skin: Negative for color change and rash.   Allergic/Immunologic: Positive for environmental allergies.   Neurological: Negative for dizziness, speech difficulty and headache.   Psychiatric/Behavioral: Negative for decreased concentration. The patient is not nervous/anxious.        Vitals:    02/27/19 1543 02/27/19 1635   BP: 118/98 118/94   BP Location: Left arm    Patient Position: Sitting    Cuff Size: Adult    Pulse: 84    Temp: 97.2 °F (36.2 °C)    TempSrc: Temporal    Weight: 72.6 kg (160 lb)    Height: 162.6 cm (64.02\")    PainSc: 0-No pain  "         Current Outpatient Medications:   •  albuterol sulfate  (90 Base) MCG/ACT inhaler, Inhale 2 puffs Every 4 (Four) Hours As Needed for Wheezing., Disp: 1 inhaler, Rfl: 0  •  hydrochlorothiazide (HYDRODIURIL) 25 MG tablet, Take 1 tablet by mouth Daily., Disp: 30 tablet, Rfl: 2  •  Multiple Vitamin (DAILY VITAMIN) tablet, Take 1 tablet by mouth daily., Disp: , Rfl:   •  norgestimate-ethinyl estradiol (ORTHO TRI-CYCLEN, 28,) 0.18/0.215/0.25 MG-35 MCG per tablet, Take 1 tablet by mouth Daily., Disp: 84 tablet, Rfl: 4  •  Fluticasone Furoate-Vilanterol (BREO ELLIPTA) 100-25 MCG/INH inhaler, Inhale 1 puff Daily., Disp: 1 each, Rfl: 0    Physical Exam   Constitutional: She appears well-developed and well-nourished.   HENT:   Head: Normocephalic.   Right Ear: Hearing, tympanic membrane and ear canal normal. No drainage.   Left Ear: Hearing, tympanic membrane and ear canal normal. No drainage.   Nose: Nose normal.   Mouth/Throat: Oropharynx is clear and moist.   Eyes: Conjunctivae and lids are normal. Pupils are equal, round, and reactive to light.   Neck: Normal range of motion. Neck supple.   Cardiovascular: Normal rate, regular rhythm, normal heart sounds and normal pulses.   Pulmonary/Chest: Effort normal. No respiratory distress. She has wheezes in the right upper field, the right middle field, the right lower field, the left upper field and the left lower field.   Abdominal: Normal appearance.   Neurological: She is alert. GCS eye subscore is 4. GCS verbal subscore is 5. GCS motor subscore is 6.   Psychiatric: She has a normal mood and affect. Her speech is normal and behavior is normal.       Results Review:    None    Procedures    Assessment/Plan:    Problem List Items Addressed This Visit        Cardiovascular and Mediastinum    Benign essential hypertension    Current Assessment & Plan     Continue hctz 25 mg daily, good hydration, reducing sodium, increase exercise and tracking bp.    Continue  medications as prescribed.  Check BP at home and keep a log for your next visit.    •In general, adults should engage in aerobic physical activity 3-4 times a week with each session lasting an average of 40 minutes.  Goal for 150 minutes per week total.  •Moderate (brisk walking or jogging) to vigorous (running or biking) physical activity is recommended.  •There are many helpful strategies for heart-healthy eating, including the DASH diet and the [employer]'s Choose My Plate.   •Patients with high blood pressure should consume no more than 2,400 mg of sodium a day, ideally reducing sodium intake to 1,500 mg a day. However, even reducing sodium intake in one's current diet by 1,000 mg each day can help lower blood pressure.    Limit alcohol consumption.    Information obtained from the American College of Cardiology and American Heart Association.           Relevant Medications    hydrochlorothiazide (HYDRODIURIL) 25 MG tablet      Other Visit Diagnoses     Mild intermittent asthmatic bronchitis with acute exacerbation    -  Primary    Relevant Medications    Fluticasone Furoate-Vilanterol (BREO ELLIPTA) 100-25 MCG/INH inhaler          Plan of care reviewed with patient at the conclusion of today's visit. Education was provided regarding diagnosis, management and any prescribed or recommended OTC medications.  Patient verbalizes understanding of and agreement with management plan.    Pt was given instructions for [facility] use for home care.  She demonstrated understanding and verbalized understanding.  We discussed importance of oral care following inhaler usage.  She verbalized understanding of all.  Patient was instructed to continue taking Mucinex as previously taking and to continue hydrating well with fluids.  Use albuterol 2 puffs every 4-6 hours as needed for wheezing, shortness of breath, chest tightness or excessive coughing.    Return in about 2 weeks (around 3/13/2019) for Recheck.    *Note that portions of this  note were completed with a voice recognition program.  Efforts were made to edit the dictation but occasionally words are transcribed.    Pamella Andujar APRN

## 2019-02-27 NOTE — ASSESSMENT & PLAN NOTE
Continue hctz 25 mg daily, good hydration, reducing sodium, increase exercise and tracking bp.    Continue medications as prescribed.  Check BP at home and keep a log for your next visit.    •In general, adults should engage in aerobic physical activity 3-4 times a week with each session lasting an average of 40 minutes.  Goal for 150 minutes per week total.  •Moderate (brisk walking or jogging) to vigorous (running or biking) physical activity is recommended.  •There are many helpful strategies for heart-healthy eating, including the DASH diet and the [employer]'s Choose My Plate.   •Patients with high blood pressure should consume no more than 2,400 mg of sodium a day, ideally reducing sodium intake to 1,500 mg a day. However, even reducing sodium intake in one's current diet by 1,000 mg each day can help lower blood pressure.    Limit alcohol consumption.    Information obtained from the American College of Cardiology and American Heart Association.

## 2019-02-27 NOTE — PATIENT INSTRUCTIONS
How to Use a Metered Dose Inhaler  A metered dose inhaler is a handheld device for taking medicine that must be breathed into the lungs (inhaled). The device can be used to deliver a variety of inhaled medicines, including:  · Quick relief or rescue medicines, such as bronchodilators.  · Controller medicines, such as corticosteroids.    The medicine is delivered by pushing down on a metal canister to release a preset amount of spray and medicine. Each device contains the amount of medicine that is needed for a preset number of uses (inhalations).  Your health care provider may recommend that you use a spacer with your inhaler to help you take the medicine more effectively. A spacer is a plastic tube with a mouthpiece on one end and an opening that connects to the inhaler on the other end. A spacer holds the medicine in a tube for a short time, which allows you to inhale more medicine.  What are the risks?  If you do not use your inhaler correctly, medicine might not reach your lungs to help you breathe.  Inhaler medicine can cause side effects, such as:  · Mouth or throat infection.  · Cough.  · Hoarseness.  · Headache.  · Nausea and vomiting.  · Lung infection (pneumonia) in people who have a lung condition called COPD.    How to use a metered dose inhaler without a spacer  1. Remove the cap from the inhaler.  2. If you are using the inhaler for the first time, shake it for 5 seconds, turn it away from your face, then release 4 puffs into the air. This is called priming.  3. Shake the inhaler for 5 seconds.  4. Position the inhaler so the top of the canister faces up.  5. Put your index finger on the top of the medicine canister. Support the bottom of the inhaler with your thumb.  6. Breathe out normally and as completely as possible, away from the inhaler.  7. Either place the inhaler between your teeth and close your lips tightly around the mouthpiece, or hold the inhaler 1-2 inches (2.5-5 cm) away from your open  mouth. Keep your tongue down out of the way. If you are unsure which technique to use, ask your health care provider.  8. Press the canister down with your index finger to release the medicine, then inhale deeply and slowly through your mouth (not your nose) until your lungs are completely filled. Inhaling should take 4-6 seconds.  9. Hold the medicine in your lungs for 5-10 seconds (10 seconds is best). This helps the medicine get into the small airways of your lungs.  10. With your lips in a tight [place] (pursed), breathe out slowly.  11. Repeat steps 3-10 until you have taken the number of puffs that your health care provider directed. Wait about 1 minute between puffs or as directed.  12. Put the cap on the inhaler.  13. If you are using a steroid inhaler, rinse your mouth with water, gargle, and spit out the water. Do not swallow the water.  How to use a metered dose inhaler with a spacer  1. Remove the cap from the inhaler.  2. If you are using the inhaler for the first time, shake it for 5 seconds, turn it away from your face, then release 4 puffs into the air. This is called priming.  3. Shake the inhaler for 5 seconds.  4. Place the open end of the spacer onto the inhaler mouthpiece.  5. Position the inhaler so the top of the canister faces up and the spacer mouthpiece faces you.  6. Put your index finger on the top of the medicine canister. Support the bottom of the inhaler and the spacer with your thumb.  7. Breathe out normally and as completely as possible, away from the spacer.  8. Place the spacer between your teeth and close your lips tightly around it. Keep your tongue down out of the way.  9. Press the canister down with your index finger to release the medicine, then inhale deeply and slowly through your mouth (not your nose) until your lungs are completely filled. Inhaling should take 4-6 seconds.  10. Hold the medicine in your lungs for 5-10 seconds (10 seconds is best). This helps the medicine  get into the small airways of your lungs.  11. With your lips in a tight [place] (pursed), breathe out slowly.  12. Repeat steps 3-11 until you have taken the number of puffs that your health care provider directed. Wait about 1 minute between puffs or as directed.  13. Remove the spacer from the inhaler and put the cap on the inhaler.  14. If you are using a steroid inhaler, rinse your mouth with water, gargle, and spit out the water. Do not swallow the water.  Follow these instructions at home:  · Take your inhaled medicine only as told by your health care provider. Do not use the inhaler more than directed by your health care provider.  · Keep all follow-up visits as told by your health care provider. This is important.  · If your inhaler has a counter, you can check it to determine how full your inhaler is. If your inhaler does not have a counter, ask your health care provider when you will need to refill your inhaler and write the refill date on a calendar or on your inhaler canister. Note that you cannot know when an inhaler is empty by shaking it.  · Follow directions on the package insert for care and cleaning of your inhaler and spacer.  Contact a health care provider if:  · Symptoms are only partially relieved with your inhaler.  · You are having trouble using your inhaler.  · You have an increase in phlegm.  · You have headaches.  Get help right away if:  · You feel little or no relief after using your inhaler.  · You have dizziness.  · You have a fast heart rate.  · You have chills or a fever.  · You have night sweats.  · There is blood in your phlegm.  Summary  · A metered dose inhaler is a handheld device for taking medicine that must be breathed into the lungs (inhaled).  · The medicine is delivered by pushing down on a metal canister to release a preset amount of spray and medicine.  · Each device contains the amount of medicine that is needed for a preset number of uses (inhalations).  This  information is not intended to replace advice given to you by your health care provider. Make sure you discuss any questions you have with your health care provider.  Document Released: 12/18/2006 Document Revised: 11/07/2017 Document Reviewed: 11/07/2017  Elsevier Interactive Patient Education © 2017 [employer]

## 2019-03-01 ENCOUNTER — TELEPHONE (OUTPATIENT)
Dept: INTERNAL MEDICINE | Facility: CLINIC | Age: 34
End: 2019-03-01

## 2019-03-01 RX ORDER — LEVOFLOXACIN 500 MG/1
500 TABLET, FILM COATED ORAL DAILY
Qty: 5 TABLET | Refills: 0 | Status: SHIPPED | OUTPATIENT
Start: 2019-03-01 | End: 2019-10-11

## 2019-03-01 RX ORDER — METHYLPREDNISOLONE 4 MG/1
TABLET ORAL
Qty: 1 EACH | Refills: 0 | Status: SHIPPED | OUTPATIENT
Start: 2019-03-01 | End: 2019-10-11

## 2019-03-01 NOTE — TELEPHONE ENCOUNTER
PT CALLED checking on status of antibiotics that were supposed to be sent in yesterday but wasn't there     She verbalized understanding that they were sent in this morning to pharmacist

## 2019-03-04 ENCOUNTER — TELEPHONE (OUTPATIENT)
Dept: INTERNAL MEDICINE | Facility: CLINIC | Age: 34
End: 2019-03-04

## 2019-04-16 RX ORDER — LISINOPRIL 5 MG/1
TABLET ORAL
Qty: 30 TABLET | Refills: 0 | OUTPATIENT
Start: 2019-04-16

## 2019-05-01 RX ORDER — ALBUTEROL SULFATE 90 UG/1
2 AEROSOL, METERED RESPIRATORY (INHALATION) EVERY 4 HOURS PRN
Qty: 1 INHALER | Refills: 0 | Status: SHIPPED | OUTPATIENT
Start: 2019-05-01 | End: 2022-07-26

## 2019-07-17 RX ORDER — ALBUTEROL SULFATE 90 UG/1
AEROSOL, METERED RESPIRATORY (INHALATION)
Qty: 18 G | Refills: 3 | Status: SHIPPED | OUTPATIENT
Start: 2019-07-17 | End: 2019-10-11

## 2019-08-05 RX ORDER — LISINOPRIL 5 MG/1
TABLET ORAL
Qty: 30 TABLET | Refills: 0 | OUTPATIENT
Start: 2019-08-05

## 2019-08-05 RX ORDER — HYDROCHLOROTHIAZIDE 25 MG/1
TABLET ORAL
Qty: 30 TABLET | Refills: 5 | Status: SHIPPED | OUTPATIENT
Start: 2019-08-05 | End: 2020-09-22

## 2019-08-06 RX ORDER — LISINOPRIL 5 MG/1
TABLET ORAL
Qty: 30 TABLET | Refills: 0 | OUTPATIENT
Start: 2019-08-06

## 2019-08-06 NOTE — TELEPHONE ENCOUNTER
Patient called and stated that her Lisinopril prescription was cancelled and she did not know why.  The Lisinopril was not even on her medication listing only Hydrochlorothiazide.    Please clarify what she should be taking and her call her.

## 2019-08-06 NOTE — TELEPHONE ENCOUNTER
I thought she was only taking the hydrochlorothiazide right now.  Which she still on the lisinopril and hydrochlorothiazide?  I want her to continue on what she has been on so if I need to send in the lisinopril I can.  We may need to check with the pharmacy to see what she filled last and when.

## 2019-08-06 NOTE — TELEPHONE ENCOUNTER
Pt states she has been taking both lisinopril and hctz. She needs a refill of lisinopril 5mg once daily.

## 2019-08-07 RX ORDER — LISINOPRIL 5 MG/1
5 TABLET ORAL DAILY
Qty: 90 TABLET | Refills: 1 | Status: SHIPPED | OUTPATIENT
Start: 2019-08-07 | End: 2020-06-17

## 2019-10-06 PROBLEM — J40 BRONCHITIS: Status: RESOLVED | Noted: 2019-02-22 | Resolved: 2019-10-06

## 2019-10-06 NOTE — PROGRESS NOTES
Subjective   Chief Complaint   Patient presents with   • Gynecologic Exam     Shannan Sharp is a 34 y.o. year old  presenting to be seen for her annual exam.     SEXUAL Hx:  She is currently sexually active.  In the past year there there has been ONE new sexual partner.    Condoms are never used.  She would not like to be screened for STD's at today's exam.  Current birth control method: OCP (estrogen/progesterone).  She is happy with her current method of contraception and does not want to discuss alternative methods of contraception.  MENSTRUAL Hx:  Patient's last menstrual period was 2019 (approximate).  In the past 6 months her cycles have been regular, predictable and occur monthly.  Her menstrual flow is typically normal.   Each month on average there are roughly 0 day(s) of very heavy flow.    Intermenstrual bleeding is absent.    Post-coital bleeding is absent.  Dysmenorrhea: is not affecting her activities of daily living  PMS: is not affecting her activities of daily living  Her cycles are not a source of concern for her that she wishes to discuss today.  HEALTH Hx:  She exercises regularly: yes.  She wears her seat belt: yes.  She has concerns about domestic violence: no.  OTHER THINGS SHE WANTS TO DISCUSS TODAY:  Nothing else    The following portions of the patient's history were reviewed and updated as appropriate:problem list, current medications, allergies, past family history, past medical history, past social history and past surgical history.    Social History    Tobacco Use      Smoking status: Never Smoker      Smokeless tobacco: Never Used    Review of Systems  Constitutional POS: nothing reported    NEG: anorexia or night sweats   Genitourinary POS: nothing reported    NEG: dysuria or hematuria      Gastointestinal POS: nothing reported    NEG: bloating, change in bowel habits, melena or reflux symptoms   Integument POS: nothing reported    NEG: moles that are changing in  size, shape, color or rashes   Breast POS: nothing reported    NEG: persistent breast lump, skin dimpling or nipple discharge        Objective   /74   Resp 14   Wt 74.8 kg (165 lb)   LMP 09/18/2019 (Approximate)   Breastfeeding? No   BMI 28.31 kg/m²     General:  well developed; well nourished  no acute distress   Skin:  No suspicious lesions seen   Thyroid: normal to inspection and palpation   Breasts:  Examined in supine position  Symmetric without masses or skin dimpling  Nipples normal without inversion, lesions or discharge  There are no palpable axillary nodes   Abdomen: soft, non-tender; no masses  no umbilical or inguinal hernias are present  no hepato-splenomegaly   Pelvis: Clinical staff was present for exam  External genitalia:  normal appearance of the external genitalia including Bartholin's and Tappan's glands.  :  urethral meatus normal;  Vaginal:  normal pink mucosa without prolapse or lesions.  Cervix:  normal appearance.  Uterus:  normal size, shape and consistency.  Adnexa:  normal bimanual exam of the adnexa.  Rectal:  digital rectal exam not performed; anus visually normal appearing.        Assessment   1. Normal GYN exam  2. HTN - stable with medication     Plan   1. Pap was not done today.  I explained to Shannan that the recommendations for Pap smear interval in a low risk patient has lengthened to 3 years time.  I told Shannan she still needs to be seen in our office yearly for a full physical including breast and pelvic exam.  2. Explained to Shannan that if she were to consider conception in the coming year I would like to see her preconceptional he to talk about adjusting antihypertensive medication prior to stopping birth control  3. The importance of keeping all planned follow-up and taking all medications as prescribed was emphasized.  4. Follow up for annual exam 1 year    New Medications Ordered This Visit   Medications   • norgestimate-ethinyl estradiol (ORTHO TRI-CYCLEN, 28,)  0.18/0.215/0.25 MG-35 MCG per tablet     Sig: Take 1 tablet by mouth Daily.     Dispense:  84 tablet     Refill:  4          This note was electronically signed.    Sudheer Hamlin M.D.  October 11, 2019    Note: Speech recognition transcription software may have been used to create portions of this document.  An attempt at proofreading has been made but errors in transcription could still be present.

## 2019-10-11 ENCOUNTER — OFFICE VISIT (OUTPATIENT)
Dept: OBSTETRICS AND GYNECOLOGY | Facility: CLINIC | Age: 34
End: 2019-10-11

## 2019-10-11 VITALS
WEIGHT: 165 LBS | DIASTOLIC BLOOD PRESSURE: 74 MMHG | SYSTOLIC BLOOD PRESSURE: 116 MMHG | RESPIRATION RATE: 14 BRPM | BODY MASS INDEX: 28.31 KG/M2

## 2019-10-11 DIAGNOSIS — Z01.419 WELL WOMAN EXAM WITH ROUTINE GYNECOLOGICAL EXAM: Primary | ICD-10-CM

## 2019-10-11 PROBLEM — I10 ESSENTIAL HYPERTENSION: Status: ACTIVE | Noted: 2019-01-01

## 2019-10-11 PROCEDURE — 99395 PREV VISIT EST AGE 18-39: CPT | Performed by: OBSTETRICS & GYNECOLOGY

## 2019-10-11 RX ORDER — NORGESTIMATE AND ETHINYL ESTRADIOL 7DAYSX3 28
1 KIT ORAL DAILY
Qty: 84 TABLET | Refills: 4 | Status: SHIPPED | OUTPATIENT
Start: 2019-10-11 | End: 2020-01-06

## 2019-11-22 ENCOUNTER — TELEPHONE (OUTPATIENT)
Dept: INTERNAL MEDICINE | Facility: CLINIC | Age: 34
End: 2019-11-22

## 2019-11-22 RX ORDER — ONDANSETRON 4 MG/1
4 TABLET, FILM COATED ORAL EVERY 8 HOURS PRN
Qty: 20 TABLET | Refills: 0 | Status: SHIPPED | OUTPATIENT
Start: 2019-11-22 | End: 2020-10-15

## 2019-11-22 NOTE — TELEPHONE ENCOUNTER
Pt called in states she is feeling terrible her symptoms diarrhea stomach bubble and nauseous    She wanted to see if someone can call her an antibiotics and something for nauseous    Confirmed [facility]

## 2020-01-06 ENCOUNTER — TELEPHONE (OUTPATIENT)
Dept: OBSTETRICS AND GYNECOLOGY | Facility: CLINIC | Age: 35
End: 2020-01-06

## 2020-01-06 RX ORDER — NORGESTIMATE AND ETHINYL ESTRADIOL 7DAYSX3 28
1 KIT ORAL DAILY
Qty: 28 TABLET | Refills: 12 | Status: SHIPPED | OUTPATIENT
Start: 2020-01-06 | End: 2020-05-20 | Stop reason: SDUPTHER

## 2020-01-06 RX ORDER — NORGESTIMATE AND ETHINYL ESTRADIOL 7DAYSX3 LO
1 KIT ORAL DAILY
Qty: 28 TABLET | Refills: 12 | Status: SHIPPED | OUTPATIENT
Start: 2020-01-06 | End: 2020-01-06

## 2020-01-06 RX ORDER — NORGESTIMATE AND ETHINYL ESTRADIOL 7DAYSX3 28
KIT ORAL
Qty: 84 TABLET | Refills: 3 | OUTPATIENT
Start: 2020-01-06

## 2020-01-06 NOTE — TELEPHONE ENCOUNTER
It is done.    New Medications Ordered This Visit   Medications   • norgestimate-ethinyl estradiol (ORTHO TRI-CYCLEN LO) 0.18/0.215/0.25 MG-25 MCG per tablet     Sig: Take 1 tablet by mouth Daily.     Dispense:  28 tablet     Refill:  12

## 2020-01-06 NOTE — TELEPHONE ENCOUNTER
Pt called stating the RX for birth control that was sent at her last visit is $100 more than what she usually gets. Needs to be sent as trisprintec to [facility]

## 2020-01-07 RX ORDER — NORGESTIMATE AND ETHINYL ESTRADIOL 7DAYSX3 28
KIT ORAL
Qty: 84 TABLET | Refills: 3 | OUTPATIENT
Start: 2020-01-07

## 2020-01-22 ENCOUNTER — TELEPHONE (OUTPATIENT)
Dept: OBSTETRICS AND GYNECOLOGY | Facility: CLINIC | Age: 35
End: 2020-01-22

## 2020-01-22 NOTE — TELEPHONE ENCOUNTER
Pt called stating her OCP that was sent is showing she'll pay over $100; she is requesting Tri SPrintec to be sent. Advised pt it was sent in on 1/6/2020; pt stated she would call her pharmacy to see what they have

## 2020-05-20 ENCOUNTER — TELEPHONE (OUTPATIENT)
Dept: OBSTETRICS AND GYNECOLOGY | Facility: CLINIC | Age: 35
End: 2020-05-20

## 2020-05-20 RX ORDER — NORGESTIMATE AND ETHINYL ESTRADIOL 7DAYSX3 28
1 KIT ORAL DAILY
Qty: 84 TABLET | Refills: 1 | Status: SHIPPED | OUTPATIENT
Start: 2020-05-20 | End: 2020-10-15 | Stop reason: SDUPTHER

## 2020-06-17 RX ORDER — LISINOPRIL 5 MG/1
TABLET ORAL
Qty: 30 TABLET | Refills: 0 | Status: SHIPPED | OUTPATIENT
Start: 2020-06-17 | End: 2020-10-26

## 2020-09-22 RX ORDER — HYDROCHLOROTHIAZIDE 25 MG/1
TABLET ORAL
Qty: 30 TABLET | Refills: 5 | Status: SHIPPED | OUTPATIENT
Start: 2020-09-22 | End: 2020-10-30 | Stop reason: SDUPTHER

## 2020-10-15 ENCOUNTER — OFFICE VISIT (OUTPATIENT)
Dept: OBSTETRICS AND GYNECOLOGY | Facility: CLINIC | Age: 35
End: 2020-10-15

## 2020-10-15 VITALS
SYSTOLIC BLOOD PRESSURE: 112 MMHG | BODY MASS INDEX: 28.14 KG/M2 | WEIGHT: 164 LBS | DIASTOLIC BLOOD PRESSURE: 74 MMHG | RESPIRATION RATE: 14 BRPM

## 2020-10-15 DIAGNOSIS — E78.2 MIXED HYPERLIPIDEMIA: ICD-10-CM

## 2020-10-15 DIAGNOSIS — Z01.419 WELL WOMAN EXAM WITH ROUTINE GYNECOLOGICAL EXAM: Primary | ICD-10-CM

## 2020-10-15 DIAGNOSIS — Z11.59 ENCOUNTER FOR HEPATITIS C SCREENING TEST FOR LOW RISK PATIENT: ICD-10-CM

## 2020-10-15 DIAGNOSIS — I10 ESSENTIAL HYPERTENSION: ICD-10-CM

## 2020-10-15 DIAGNOSIS — Z71.85 VACCINE COUNSELING: ICD-10-CM

## 2020-10-15 PROCEDURE — 99395 PREV VISIT EST AGE 18-39: CPT | Performed by: OBSTETRICS & GYNECOLOGY

## 2020-10-15 RX ORDER — NORGESTIMATE AND ETHINYL ESTRADIOL 7DAYSX3 28
1 KIT ORAL DAILY
Qty: 84 TABLET | Refills: 4 | Status: SHIPPED | OUTPATIENT
Start: 2020-10-15 | End: 2021-10-18 | Stop reason: SDUPTHER

## 2020-10-15 NOTE — PATIENT INSTRUCTIONS
Pneumococcal Polysaccharide Vaccine (PPSV23): What You Need to Know      1. Why get vaccinated?  Pneumococcal polysaccharide vaccine (PPSV23) can prevent pneumococcal disease.  Pneumococcal disease refers to any illness caused by pneumococcal bacteria. These bacteria can cause many types of illnesses, including pneumonia, which is an infection of the lungs. Pneumococcal bacteria are one of the most common causes of pneumonia.  Besides pneumonia, pneumococcal bacteria can also cause:  · Ear infections  · Sinus infections  · Meningitis (infection of the tissue covering the brain and spinal cord)  · Bacteremia (bloodstream infection)  Anyone can get pneumococcal disease, but children under 2 years of age, people with certain medical conditions, adults 65 years or older, and cigarette smokers are at the highest risk.  Most pneumococcal infections are mild. However, some can result in long-term problems, such as brain damage or hearing loss. Meningitis, bacteremia, and pneumonia caused by pneumococcal disease can be fatal.  2. PPSV23  PPSV23 protects against 23 types of bacteria that cause pneumococcal disease.  PPSV23 is recommended for:  · All adults 65 years or older,  · Anyone 2 years or older with certain medical conditions that can lead to an increased risk for pneumococcal disease.  Most people need only one dose of PPSV23. A second dose of PPSV23, and another type of pneumococcal vaccine called PCV13, are recommended for certain high-risk groups. Your health care provider can give you more information.  People 65 years or older should get a dose of PPSV23 even if they have already gotten one or more doses of the vaccine before they turned 65.  3. Talk with your health care provider  Tell your vaccine provider if the person getting the vaccine:  · Has had an allergic reaction after a previous dose of PPSV23, or has any severe, life-threatening allergies.  In some cases, your health care provider may decide to  postpone PPSV23 vaccination to a future visit.  People with minor illnesses, such as a cold, may be vaccinated. People who are moderately or severely ill should usually wait until they recover before getting PPSV23.  Your health care provider can give you more information.  4. Risks of a vaccine reaction  Redness or pain where the shot is given, feeling tired, fever, or muscle aches can happen after PPSV23.  People sometimes faint after medical procedures, including vaccination. Tell your provider if you feel dizzy or have vision changes or ringing in the ears.  As with any medicine, there is a very remote chance of a vaccine causing a severe allergic reaction, other serious injury, or death.  5. What if there is a serious problem?  An allergic reaction could occur after the vaccinated person leaves the clinic. If you see signs of a severe allergic reaction (hives, swelling of the face and throat, difficulty breathing, a fast heartbeat, dizziness, or weakness), call 9-1-1 and get the person to the nearest hospital.  For other signs that concern you, call your health care provider.  Adverse reactions should be reported to the Vaccine Adverse Event Reporting System (VAERS). Your health care provider will usually file this report, or you can do it yourself. Visit the VAERS website at www.vaers.hhs.gov or call 1-361.925.8917. VAERS is only for reporting reactions, and VAERS staff do not give medical advice.  6. How can I learn more?  Ask your health care provider.  Call your local or state health department.  Contact the Centers for Disease Control and Prevention (CDC):  ? Call 1-864.140.6589 (2-248-KIW-INFO) or  ? Visit CDC's website at www.cdc.gov/vaccines    CDC Vaccine Information Statement PPSV23 Vaccine (10/30/2019)  This information is not intended to replace advice given to you by your health care provider. Make sure you discuss any questions you have with your health care provider.  Document Released:  10/15/2007 Document Revised: 04/07/2020 Document Reviewed: 07/30/2019  Elsevier Patient Education © 2020 [email or web address]      Influenza (Flu) Vaccine (Inactivated or Recombinant): What You Need to Know      1. Why get vaccinated?  Influenza vaccine can prevent influenza (flu).  Flu is a contagious disease that spreads around the [place] every year, usually between October and May. Anyone can get the flu, but it is more dangerous for some people. Infants and young children, people 65 years of age and older, pregnant women, and people with certain health conditions or a weakened immune system are at greatest risk of flu complications.  Pneumonia, bronchitis, sinus infections and ear infections are examples of flu-related complications. If you have a medical condition, such as heart disease, cancer or diabetes, flu can make it worse.  Flu can cause fever and chills, sore throat, muscle aches, fatigue, cough, headache, and runny or stuffy nose. Some people may have vomiting and diarrhea, though this is more common in children than adults.  Each year thousands of people in the [place] die from flu, and many more are hospitalized. Flu vaccine prevents millions of illnesses and flu-related visits to the doctor each year.  2. Influenza vaccine  CDC recommends everyone 6 months of age and older get vaccinated every flu season. Children 6 months through 8 years of age may need 2 doses during a single flu season. Everyone else needs only 1 dose each flu season.  It takes about 2 weeks for protection to develop after vaccination.  There are many flu viruses, and they are always changing. Each year a new flu vaccine is made to protect against three or four viruses that are likely to cause disease in the upcoming flu season. Even when the vaccine doesn't exactly match these viruses, it may still provide some protection.  Influenza vaccine does not cause flu.  Influenza vaccine may be given at the same time as other  vaccines.  3. Talk with your health care provider  Tell your vaccine provider if the person getting the vaccine:  · Has had an allergic reaction after a previous dose of influenza vaccine, or has any severe, life-threatening allergies.  · Has ever had Guillain-Barré Syndrome (also called GBS).  In some cases, your health care provider may decide to postpone influenza vaccination to a future visit.  People with minor illnesses, such as a cold, may be vaccinated. People who are moderately or severely ill should usually wait until they recover before getting influenza vaccine.  Your health care provider can give you more information.  4. Risks of a vaccine reaction  · Soreness, redness, and swelling where shot is given, fever, muscle aches, and headache can happen after influenza vaccine.  · There may be a very small increased risk of Guillain-Barré Syndrome (GBS) after inactivated influenza vaccine (the flu shot).  · Young children who get the flu shot along with pneumococcal vaccine (PCV13), and/or DTaP vaccine at the same time might be slightly more likely to have a seizure caused by fever. Tell your health care provider if a child who is getting flu vaccine has ever had a seizure.  · People sometimes faint after medical procedures, including vaccination. Tell your provider if you feel dizzy or have vision changes or ringing in the ears.  · As with any medicine, there is a very remote chance of a vaccine causing a severe allergic reaction, other serious injury, or death.  5. What if there is a serious problem?  An allergic reaction could occur after the vaccinated person leaves the clinic. If you see signs of a severe allergic reaction (hives, swelling of the face and throat, difficulty breathing, a fast heartbeat, dizziness, or weakness), call 9-1-1 and get the person to the nearest hospital.  For other signs that concern you, call your health care provider.  Adverse reactions should be reported to the Vaccine  Adverse Event Reporting System (VAERS). Your health care provider will usually file this report, or you can do it yourself. Visit the VAERS website at www.vaers.hhs.gov or call 1-604.387.3058.VAERS is only for reporting reactions, and VAERS staff do not give medical advice.  6. The National Vaccine Injury Compensation Program  The National Vaccine Injury Compensation Program (VICP) is a federal program that was created to compensate people who may have been injured by certain vaccines. Visit the VICP website at www.[facility].gov/vaccinecompensation or call 1-912.516.6330 to learn about the program and about filing a claim. There is a time limit to file a claim for compensation.  7. How can I learn more?  · Ask your healthcare provider.  · Call your local or state health department.  · Contact the Centers for Disease Control and Prevention (CDC):  ? Call 1-931.464.7878 (0-696-SNQ-INFO) or  ? Visit CDC's www.cdc.gov/flu    Vaccine Information Statement (Interim) Inactivated Influenza Vaccine (8/15/2019)  This information is not intended to replace advice given to you by your health care provider. Make sure you discuss any questions you have with your health care provider.  Document Released: 10/12/2007 Document Revised: 04/07/2020 Document Reviewed: 08/19/2019  Elsevier Patient Education © 2020 [employer] Inc[employer]

## 2020-10-15 NOTE — PROGRESS NOTES
Subjective   Chief Complaint   Patient presents with   • Gynecologic Exam     Shannan Sharp is a 35 y.o. year old  presenting to be seen for her annual exam.     SEXUAL Hx:  She is currently sexually active.  In the past year there there has been NO new sexual partners.    Condoms are never used.  She would not like to be screened for STD's at today's exam.  Current birth control method: OCP (estrogen/progesterone).  She is happy with her current method of contraception and does not want to discuss alternative methods of contraception.  MENSTRUAL Hx:  Patient's last menstrual period was 10/04/2020 (approximate).  In the past 6 months her cycles have been regular, predictable and occur monthly.  Her menstrual flow is typically normal.   Each month on average there are roughly 0 day(s) of very heavy flow.  Intermenstrual bleeding is absent.    Post-coital bleeding is absent.  Dysmenorrhea: is not affecting her activities of daily living  PMS: is not affecting her activities of daily living  Her cycles are not a source of concern for her that she wishes to discuss today.  HEALTH Hx:  She exercises regularly: yes.  She wears her seat belt: yes.  She has concerns about domestic violence: no.  OTHER THINGS SHE WANTS TO DISCUSS TODAY:  Nothing else    The following portions of the patient's history were reviewed and updated as appropriate:problem list, current medications, allergies, past family history, past medical history, past social history and past surgical history.    Social History    Tobacco Use      Smoking status: Never Smoker      Smokeless tobacco: Never Used    Review of Systems  Constitutional POS: nothing reported    NEG: anorexia or night sweats   Genitourinary POS: nothing reported    NEG: dysuria or hematuria      Gastointestinal POS: nothing reported    NEG: bloating, change in bowel habits, melena or reflux symptoms   Integument POS: nothing reported    NEG: moles that are changing in  size, shape, color or rashes   Breast POS: nothing reported    NEG: persistent breast lump, skin dimpling or nipple discharge        Objective   /74   Resp 14   Wt 74.4 kg (164 lb)   LMP 10/04/2020 (Approximate)   Breastfeeding No   BMI 28.14 kg/m²     General:  well developed; well nourished  no acute distress   Skin:  No suspicious lesions seen   Thyroid: normal to inspection and palpation   Breasts:  Examined in supine position  Symmetric without masses or skin dimpling  Nipples normal without inversion, lesions or discharge  There are no palpable axillary nodes   Abdomen: soft, non-tender; no masses  no umbilical or inguinal hernias are present  no hepato-splenomegaly   Pelvis: Clinical staff was present for exam  External genitalia:  normal appearance of the external genitalia including Bartholin's and Beaver's glands.  :  urethral meatus normal;  Vaginal:  normal pink mucosa without prolapse or lesions.  Cervix:  normal appearance.  Uterus:  normal size, shape and consistency.  Adnexa:  normal bimanual exam of the adnexa.  Rectal:  digital rectal exam not performed; anus visually normal appearing.        Assessment   1. Normal GYN exam  2. History of hypertension stable on medication  3. History of borderline hyperlipidemia     Plan   1. Pap was done today.  If she does not receive the results of the Pap within 2 weeks  time, she was instructed to call to find out the results.  I explained to Shannan that the recommendations for Pap smear interval in a low risk patient's has lengthened to 3 years time.  I encouraged her to be seen yearly for a full physical exam including breast and pelvic exam even during the off years when PAP's will not be performed.  2. The following tests were ordered today: Hep C Ab and lipid profile.  It was explained to Shannan that all lab test should be back within the one week after they are performed. She will be notified about the results, regardless of the findings. If  she has not been contacted by the office within 2 weeks after the test has been performed, it is her responsibility to contact us to learn about her results.  3. I discussed with Shannan that she may be behind on needed vaccinations for Influenza and Pneumoccal (PPSV23 only).  She may be able to obtain these vaccinations at her local pharmacy OR speak about obtaining them with her primary care.  If she does obtain her vaccines, I have asked Shannan to let us know the date each vaccine was obtained so that her medical record could be updated in our system.  4. The importance of keeping all planned follow-up and taking all medications as prescribed was emphasized.  5. Follow up for annual exam 1 year    New Medications Ordered This Visit   Medications   • Tri-Sprintec 0.18/0.215/0.25 MG-35 MCG per tablet     Sig: Take 1 tablet by mouth Daily.     Dispense:  84 tablet     Refill:  4     Please give tri sprintec          This note was electronically signed.    Sudheer Hamlin M.D.  October 15, 2020    Note: Speech recognition transcription software may have been used to create portions of this document.  An attempt at proofreading has been made but errors in transcription could still be present.

## 2020-10-20 ENCOUNTER — LAB (OUTPATIENT)
Dept: LAB | Facility: HOSPITAL | Age: 35
End: 2020-10-20

## 2020-10-20 DIAGNOSIS — E78.2 MIXED HYPERLIPIDEMIA: ICD-10-CM

## 2020-10-20 DIAGNOSIS — Z11.59 ENCOUNTER FOR HEPATITIS C SCREENING TEST FOR LOW RISK PATIENT: ICD-10-CM

## 2020-10-20 LAB
CHOLEST SERPL-MCNC: 213 MG/DL (ref 0–200)
HCV AB SER DONR QL: NORMAL
HDLC SERPL-MCNC: 61 MG/DL (ref 40–60)
LDLC SERPL CALC-MCNC: 127 MG/DL (ref 0–100)
LDLC/HDLC SERPL: 2.03 {RATIO}
TRIGL SERPL-MCNC: 140 MG/DL (ref 0–150)
VLDLC SERPL-MCNC: 25 MG/DL (ref 5–40)

## 2020-10-20 PROCEDURE — 36415 COLL VENOUS BLD VENIPUNCTURE: CPT

## 2020-10-20 PROCEDURE — 80061 LIPID PANEL: CPT

## 2020-10-20 PROCEDURE — 86803 HEPATITIS C AB TEST: CPT

## 2020-10-26 RX ORDER — LISINOPRIL 5 MG/1
TABLET ORAL
Qty: 30 TABLET | Refills: 0 | Status: SHIPPED | OUTPATIENT
Start: 2020-10-26 | End: 2020-10-30 | Stop reason: SDUPTHER

## 2020-10-26 NOTE — TELEPHONE ENCOUNTER
Patient scheduled 10-30-20 for mychart video visit.  She said she has not heard from us and was not aware she needed to be seen for follow up appts.  I advised her HTN is typically seen every 3-6 months in office.    She stated she was not taking her BP regularly and noticed her reading were elevated so she has been better about taking it as directed.     Needs refill sent to pharmacy

## 2020-10-26 NOTE — TELEPHONE ENCOUNTER
Call and see if she has established care with another provider?  I will send 1 mo supply if needed so she can get appt here

## 2020-10-30 ENCOUNTER — TELEMEDICINE (OUTPATIENT)
Dept: INTERNAL MEDICINE | Facility: CLINIC | Age: 35
End: 2020-10-30

## 2020-10-30 VITALS — SYSTOLIC BLOOD PRESSURE: 120 MMHG | WEIGHT: 159 LBS | BODY MASS INDEX: 27.28 KG/M2 | DIASTOLIC BLOOD PRESSURE: 80 MMHG

## 2020-10-30 DIAGNOSIS — F41.8 SITUATIONAL ANXIETY: ICD-10-CM

## 2020-10-30 DIAGNOSIS — I10 ESSENTIAL HYPERTENSION: Primary | ICD-10-CM

## 2020-10-30 PROCEDURE — 99213 OFFICE O/P EST LOW 20 MIN: CPT | Performed by: NURSE PRACTITIONER

## 2020-10-30 RX ORDER — HYDROCHLOROTHIAZIDE 25 MG/1
25 TABLET ORAL DAILY
Qty: 90 TABLET | Refills: 1 | Status: SHIPPED | OUTPATIENT
Start: 2020-10-30 | End: 2021-03-01

## 2020-10-30 RX ORDER — HYDROCHLOROTHIAZIDE 25 MG/1
25 TABLET ORAL DAILY
Qty: 90 TABLET | Refills: 1 | Status: SHIPPED | OUTPATIENT
Start: 2020-10-30 | End: 2020-10-30

## 2020-10-30 RX ORDER — LISINOPRIL 5 MG/1
5 TABLET ORAL DAILY
Qty: 90 TABLET | Refills: 1 | Status: SHIPPED | OUTPATIENT
Start: 2020-10-30 | End: 2021-03-18

## 2020-10-30 RX ORDER — LISINOPRIL 5 MG/1
5 TABLET ORAL DAILY
Qty: 90 TABLET | Refills: 1 | Status: SHIPPED | OUTPATIENT
Start: 2020-10-30 | End: 2020-10-30

## 2020-10-30 NOTE — PROGRESS NOTES
Shannan Sharp  1985  [id number]  Patient Care Team:  Pamella Andujar APRN as PCP - General (Internal Medicine)  Sudheer Hamlin MD as Obstetrician (Obstetrics and Gynecology)    Shannan Sharp is a pleasant 35 y.o. female who presents for evaluation of Hypertension    Chief Complaint   Patient presents with   • Hypertension   This video visit occurred during the Coronavirus (Covid-19) public health emergency.  Time spent was approximately 10 minutes.  Patient identity has been confirmed using name and date of birth.  I discussed with the patient the nature of our telemedicine visits that:  --I would evaluate the patient and recommend diagnostics and treatment based on my assessment.  --Our sessions are not being recorded in the personal health information is protected.  --Our team would provide follow-up care in person if/when needed.  You have chosen to receive care through a telehealth visit.  Do you consent to use a video/audio connection for your medical care today? Yes    HPI:   HTN:  Had a few months of intermittent compliance with meds during the pandemic but taking daily x 2 mo.  She turned basement into home gym and has been making healthy det modifications.  Lost about 14#.  avg home bp 120/80 on meds.    Has not needed albuterol inhaler, usually has sx late fall, getting flu shot today at pharmacy    Started Seeing mental health counserlor via telehealth after [place], then pay cut.  She is working again now. Has used xanax prn in past, once rx lasted years.  She took the last couple she had during pandemic.    Recent labs show lipid total , HDL 61,     Past Medical History:   Diagnosis Date   • Essential hypertension 2019     Past Surgical History:   Procedure Laterality Date   • LAPAROSCOPIC CHOLECYSTECTOMY  2009     Family History   Problem Relation Age of Onset   • Breast cancer Cousin         maternal 1st cousin once removed   • Diabetes Mother    •  Hypertension Mother    • Hypertension Father    • Coronary artery disease Maternal Grandmother    • Alzheimer's disease Maternal Grandmother    • Lung cancer Paternal Grandfather      Social History     Tobacco Use   Smoking Status Never Smoker   Smokeless Tobacco Never Used     Allergies   Allergen Reactions   • Erythromycin        Current Outpatient Medications:   •  albuterol sulfate  (90 Base) MCG/ACT inhaler, Inhale 2 puffs Every 4 (Four) Hours As Needed for Wheezing., Disp: 1 inhaler, Rfl: 0  •  hydroCHLOROthiazide (HYDRODIURIL) 25 MG tablet, Take 1 tablet by mouth Daily., Disp: 90 tablet, Rfl: 1  •  lisinopril (PRINIVIL,ZESTRIL) 5 MG tablet, Take 1 tablet by mouth Daily., Disp: 90 tablet, Rfl: 1  •  Multiple Vitamin (DAILY VITAMIN) tablet, Take 1 tablet by mouth daily., Disp: , Rfl:   •  Tri-Sprintec 0.18/0.215/0.25 MG-35 MCG per tablet, Take 1 tablet by mouth Daily., Disp: 84 tablet, Rfl: 4    Review of Systems  /80   Wt 72.1 kg (159 lb)   LMP 10/04/2020 (Approximate)   BMI 27.28 kg/m²     Physical Exam  Vitals signs reviewed.   Constitutional:       Appearance: She is well-developed.   Pulmonary:      Effort: Pulmonary effort is normal.   Neurological:      Mental Status: She is alert and oriented to person, place, and time.   Psychiatric:         Mood and Affect: Mood normal.         Behavior: Behavior normal.         Thought Content: Thought content normal.         Judgment: Judgment normal.         Procedures    Results Review:  None    PHQ-9 Total Score:      Assessment/Plan:  Diagnoses and all orders for this visit:    1. Essential hypertension (Primary)  Comments:  continue meds and home bp monitoring    2. Situational anxiety  Comments:  continue teleheatlh therapy, hold on xanax rx for now.  if sx become severe or more frequent will consider treatment.  could trial buspar    Other orders  -     Discontinue: hydroCHLOROthiazide (HYDRODIURIL) 25 MG tablet; Take 1 tablet by mouth Daily.   Dispense: 90 tablet; Refill: 1  -     Discontinue: lisinopril (PRINIVIL,ZESTRIL) 5 MG tablet; Take 1 tablet by mouth Daily.  Dispense: 90 tablet; Refill: 1  -     hydroCHLOROthiazide (HYDRODIURIL) 25 MG tablet; Take 1 tablet by mouth Daily.  Dispense: 90 tablet; Refill: 1  -     lisinopril (PRINIVIL,ZESTRIL) 5 MG tablet; Take 1 tablet by mouth Daily.  Dispense: 90 tablet; Refill: 1       There are no Patient Instructions on file for this visit.  Plan of care reviewed with patient at the conclusion of today's visit. Education was provided regarding diagnosis, management and any prescribed or recommended OTC medications.  Patient verbalizes understanding of and agreement with management plan.    Return in about 6 months (around 4/30/2021).    *Note that portions of this note were completed with a voice recognition program.  Efforts were made to edit the dictation but occasionally words are transcribed.    Pamella Andujar, APRN

## 2021-03-01 RX ORDER — HYDROCHLOROTHIAZIDE 25 MG/1
25 TABLET ORAL DAILY
Qty: 90 TABLET | Refills: 3 | Status: SHIPPED | OUTPATIENT
Start: 2021-03-01 | End: 2021-03-29

## 2021-03-19 RX ORDER — LISINOPRIL 5 MG/1
5 TABLET ORAL DAILY
Qty: 90 TABLET | Refills: 3 | Status: SHIPPED | OUTPATIENT
Start: 2021-03-19 | End: 2022-07-26 | Stop reason: SDUPTHER

## 2021-03-29 ENCOUNTER — TELEMEDICINE (OUTPATIENT)
Dept: INTERNAL MEDICINE | Facility: CLINIC | Age: 36
End: 2021-03-29

## 2021-03-29 DIAGNOSIS — I10 ESSENTIAL HYPERTENSION: ICD-10-CM

## 2021-03-29 DIAGNOSIS — E78.2 MIXED HYPERLIPIDEMIA: ICD-10-CM

## 2021-03-29 DIAGNOSIS — F41.8 SITUATIONAL ANXIETY: Primary | ICD-10-CM

## 2021-03-29 DIAGNOSIS — E55.9 VITAMIN D DEFICIENCY: ICD-10-CM

## 2021-03-29 PROCEDURE — 99214 OFFICE O/P EST MOD 30 MIN: CPT | Performed by: NURSE PRACTITIONER

## 2021-03-29 RX ORDER — BUSPIRONE HYDROCHLORIDE 5 MG/1
5 TABLET ORAL 2 TIMES DAILY
Qty: 60 TABLET | Refills: 1 | Status: SHIPPED | OUTPATIENT
Start: 2021-03-29 | End: 2022-07-26

## 2021-03-29 NOTE — PROGRESS NOTES
Shannan Sharp  1985  [id number]  Patient Care Team:  Pamella Andujar APRN as PCP - General (Internal Medicine)  Sudheer Hamlin MD as Obstetrician (Obstetrics and Gynecology)    Shannan Sharp is a pleasant 36 y.o. female who presents for evaluation of Anxiety and Hypertension  This video  visit occurred during the Coronavirus (Covid-19) public health emergency.  Time spent was approximately 35 minutes.  Patient identity has been confirmed using name and date of birth.  I discussed with the patient the nature of our telemedicine visits that:  --I would evaluate the patient and recommend diagnostics and treatment based on my assessment.  --Our sessions are not being recorded in the personal health information is protected.  --Our team would provide follow-up care in person if/when needed.  You have chosen to receive care through a telehealth visit.  Do you consent to use a video/audio connection for your medical care today? Yes        Chief Complaint   Patient presents with   • Anxiety   • Hypertension       HPI:   Shannan is here for 6 mo f/u of hypertension.  She has lost some weight this year and began a regular exercise routine, creating a home gym during the pandemic.  Stopped hctz after last visit, bp stayed normal.  Tried going off lisinopril also earlier this year but end of Jan had death of aunt which drastically inc. hnxiety, she resumed after a few weeks when BP trended up to 140/100.  This a.m. /81.  Using aswaganda, some days for anxiety.  Not sure that it is helpful.  She still had a few alprazolam pills left from a prescription she got 5 years ago after divorce and has used 1-1/2 of those since her aunt's passing.  Has not been sleeping well but added melatonin 3 mg than 6 mg which is helping with sleep.    Work outs 4-5  Days a week. Wt 157 consistently.  Seeing a therapist regularly this year and has also added meditation which is helping.  Added meditation also  this year.  We discussed some questions she has about vitamin supplements.  Past Medical History:   Diagnosis Date   • Essential hypertension 2019     Past Surgical History:   Procedure Laterality Date   • LAPAROSCOPIC CHOLECYSTECTOMY  2009     Family History   Problem Relation Age of Onset   • Breast cancer Cousin         maternal 1st cousin once removed   • Diabetes Mother    • Hypertension Mother    • Hypertension Father    • Coronary artery disease Maternal Grandmother    • Alzheimer's disease Maternal Grandmother    • Lung cancer Paternal Grandfather      Social History     Tobacco Use   Smoking Status Never Smoker   Smokeless Tobacco Never Used     Allergies   Allergen Reactions   • Erythromycin        Current Outpatient Medications:   •  albuterol sulfate  (90 Base) MCG/ACT inhaler, Inhale 2 puffs Every 4 (Four) Hours As Needed for Wheezing., Disp: 1 inhaler, Rfl: 0  •  lisinopril (PRINIVIL,ZESTRIL) 5 MG tablet, TAKE 1 TABLET BY MOUTH  DAILY, Disp: 90 tablet, Rfl: 3  •  Multiple Vitamin (DAILY VITAMIN) tablet, Take 1 tablet by mouth daily., Disp: , Rfl:   •  Tri-Sprintec 0.18/0.215/0.25 MG-35 MCG per tablet, Take 1 tablet by mouth Daily., Disp: 84 tablet, Rfl: 4  •  busPIRone (BUSPAR) 5 MG tablet, Take 1 tablet by mouth 2 (two) times a day., Disp: 60 tablet, Rfl: 1    Review of Systems  There were no vitals taken for this visit.    Physical Exam  Vitals reviewed.   Constitutional:       Appearance: She is well-developed.   Pulmonary:      Effort: Pulmonary effort is normal.   Neurological:      Mental Status: She is alert and oriented to person, place, and time. Mental status is at baseline.   Psychiatric:         Mood and Affect: Mood normal.         Behavior: Behavior normal.         Thought Content: Thought content normal.         Judgment: Judgment normal.         Procedures    Results Review:  None    PHQ-9 Total Score:      Assessment/Plan:  Diagnoses and all orders for this visit:    1.  Situational anxiety (Primary)  Comments:  Try BuSpar 5 mg as needed or twice daily  Orders:  -     Vitamin B12; Future    2. Mixed hyperlipidemia  Comments:  We will recheck labs within the next 6 months  Orders:  -     Lipid Panel; Future  -     TSH Rfx On Abnormal To Free T4; Future    3. Essential hypertension  Comments:  Continue lisinopril daily and monitoring BP exercise and relaxation techniques  Orders:  -     CBC & Differential; Future  -     Comprehensive Metabolic Panel; Future    4. Vitamin D deficiency  Comments:  Continue daily supplement, increase to 2000 IU, check with labs in 6 months  Orders:  -     Vitamin D 25 Hydroxy; Future    Other orders  -     busPIRone (BUSPAR) 5 MG tablet; Take 1 tablet by mouth 2 (two) times a day.  Dispense: 60 tablet; Refill: 1       There are no Patient Instructions on file for this visit.  Plan of care reviewed with patient at the conclusion of today's visit. Education was provided regarding diagnosis, management and any prescribed or recommended OTC medications.  Patient verbalizes understanding of and agreement with management plan.    No follow-ups on file.    *Note that portions of this note were completed with a voice recognition program.  Efforts were made to edit the dictation but occasionally words are transcribed.    HUMZA Diaz

## 2021-10-18 ENCOUNTER — OFFICE VISIT (OUTPATIENT)
Dept: OBSTETRICS AND GYNECOLOGY | Facility: CLINIC | Age: 36
End: 2021-10-18

## 2021-10-18 VITALS
SYSTOLIC BLOOD PRESSURE: 126 MMHG | BODY MASS INDEX: 29.17 KG/M2 | WEIGHT: 170 LBS | DIASTOLIC BLOOD PRESSURE: 82 MMHG | RESPIRATION RATE: 14 BRPM

## 2021-10-18 DIAGNOSIS — Z71.85 VACCINE COUNSELING: ICD-10-CM

## 2021-10-18 DIAGNOSIS — Z01.419 WELL WOMAN EXAM WITH ROUTINE GYNECOLOGICAL EXAM: Primary | ICD-10-CM

## 2021-10-18 PROCEDURE — 99395 PREV VISIT EST AGE 18-39: CPT | Performed by: OBSTETRICS & GYNECOLOGY

## 2021-10-18 RX ORDER — NORGESTIMATE AND ETHINYL ESTRADIOL 7DAYSX3 28
1 KIT ORAL DAILY
Qty: 84 TABLET | Refills: 4 | Status: SHIPPED | OUTPATIENT
Start: 2021-10-18 | End: 2022-10-12

## 2021-10-18 NOTE — PROGRESS NOTES
Subjective   Chief Complaint   Patient presents with   • Gynecologic Exam     Shannan Sharp is a 36 y.o. year old  presenting to be seen for her annual exam.  She does have hypertension but has been off her medicine recently.  She is trying to see how she does off it.  Her BuSpar is also on an as-needed basis and she been doing pretty well with that.    SEXUAL Hx:  She is currently sexually active.  In the past year there there has been NO new sexual partners.    Condoms are never used.  She would not like to be screened for STD's at today's exam.  Current birth control method: OCP (estrogen/progesterone).  She is happy with her current method of contraception and does not want to discuss alternative methods of contraception.  MENSTRUAL Hx:  Patient's last menstrual period was 2021 (approximate).  In the past 6 months her cycles have been regular, predictable and occur monthly.  Her menstrual flow is typically normal.   Each month on average there are roughly 0 day(s) of very heavy flow.  Intermenstrual bleeding is absent.    Post-coital bleeding is absent.  Dysmenorrhea: is not affecting her activities of daily living  PMS: is not affecting her activities of daily living  Her cycles are not a source of concern for her that she wishes to discuss today.  HEALTH Hx:  She exercises regularly: no (but is planning to start exercising more ).  She wears her seat belt: yes.  She has concerns about domestic violence: no.  OTHER THINGS SHE WANTS TO DISCUSS TODAY:  Nothing else    The following portions of the patient's history were reviewed and updated as appropriate:problem list, current medications, allergies, past family history, past medical history, past social history and past surgical history.    Social History    Tobacco Use      Smoking status: Never Smoker      Smokeless tobacco: Never Used    Review of Systems  Constitutional POS: nothing reported    NEG: anorexia or night sweats    Genitourinary POS: nothing reported    NEG: dysuria or hematuria      Gastointestinal POS: nothing reported    NEG: bloating, change in bowel habits, melena or reflux symptoms   Integument POS: nothing reported    NEG: moles that are changing in size, shape, color or rashes   Breast POS: nothing reported    NEG: persistent breast lump, skin dimpling or nipple discharge        Objective   /82   Resp 14   Wt 77.1 kg (170 lb)   LMP 09/29/2021 (Approximate)   BMI 29.17 kg/m²     General:  well developed; well nourished  no acute distress   Skin:  No suspicious lesions seen   Thyroid: normal to inspection and palpation   Breasts:  Examined in supine position  Symmetric without masses or skin dimpling  Nipples normal without inversion, lesions or discharge  There are no palpable axillary nodes   Abdomen: soft, non-tender; no masses  no umbilical or inguinal hernias are present  no hepato-splenomegaly   Pelvis: Clinical staff was present for exam  External genitalia:  normal appearance of the external genitalia including Bartholin's and Balm's glands.  :  urethral meatus normal;  Vaginal:  normal pink mucosa without prolapse or lesions.  Cervix:  normal appearance.  Uterus:  normal size, shape and consistency.  Adnexa:  normal bimanual exam of the adnexa.  Rectal:  digital rectal exam not performed; anus visually normal appearing.        Assessment   1. Normal GYN exam  2. History of hypertension - stable off medication  3. History of borderline hyperlipidemia - stable off medication  4. She is up to date on all relevant gynecologic and colorectal screenings     Plan   1. Pap was not done today.  I explained to Shannan that the recommendations for Pap smear interval in a low risk patient has lengthened to 3 years time.  I told Shannan she still needs to be seen in our office yearly for a full physical including breast and pelvic exam.  2. I discussed with Shannan that she may be behind on needed vaccinations for  Influenza.  She may be able to obtain these vaccinations at her local pharmacy OR speak about obtaining them with her primary care.  If she does obtain her vaccines, I have asked Shannan to let us know the date each vaccine was obtained so that her medical record could be updated in our system.  3. The importance of keeping all planned follow-up and taking all medications as prescribed was emphasized.  4. Follow up for annual exam 1 year    New Medications Ordered This Visit   Medications   • Tri-Sprintec 0.18/0.215/0.25 MG-35 MCG per tablet     Sig: Take 1 tablet by mouth Daily.     Dispense:  84 tablet     Refill:  4     Please give tri sprintec          This note was electronically signed.    Sudheer Hamlin M.D.  October 18, 2021    Note: Speech recognition transcription software may have been used to create portions of this document.  An attempt at proofreading has been made but errors in transcription could still be present.

## 2022-07-26 ENCOUNTER — OFFICE VISIT (OUTPATIENT)
Dept: INTERNAL MEDICINE | Facility: CLINIC | Age: 37
End: 2022-07-26

## 2022-07-26 VITALS
BODY MASS INDEX: 28.34 KG/M2 | DIASTOLIC BLOOD PRESSURE: 100 MMHG | HEART RATE: 80 BPM | WEIGHT: 166 LBS | SYSTOLIC BLOOD PRESSURE: 150 MMHG | HEIGHT: 64 IN | TEMPERATURE: 98 F

## 2022-07-26 DIAGNOSIS — Z79.899 HIGH RISK MEDICATION USE: ICD-10-CM

## 2022-07-26 DIAGNOSIS — F41.1 GAD (GENERALIZED ANXIETY DISORDER): ICD-10-CM

## 2022-07-26 DIAGNOSIS — I10 ESSENTIAL HYPERTENSION: Primary | ICD-10-CM

## 2022-07-26 PROCEDURE — 99214 OFFICE O/P EST MOD 30 MIN: CPT | Performed by: NURSE PRACTITIONER

## 2022-07-26 RX ORDER — BUSPIRONE HYDROCHLORIDE 10 MG/1
10 TABLET ORAL 2 TIMES DAILY PRN
Qty: 60 TABLET | Refills: 2 | Status: SHIPPED | OUTPATIENT
Start: 2022-07-26 | End: 2022-07-26

## 2022-07-26 RX ORDER — LISINOPRIL 5 MG/1
5 TABLET ORAL DAILY
Qty: 90 TABLET | Refills: 3 | Status: SHIPPED | OUTPATIENT
Start: 2022-07-26 | End: 2022-07-26

## 2022-07-26 RX ORDER — FLUOXETINE 10 MG/1
10 CAPSULE ORAL DAILY
Qty: 90 CAPSULE | Refills: 0 | Status: SHIPPED | OUTPATIENT
Start: 2022-07-26 | End: 2022-07-26

## 2022-07-26 RX ORDER — ALPRAZOLAM 0.5 MG/1
0.5 TABLET ORAL 2 TIMES DAILY PRN
Qty: 30 TABLET | Refills: 0 | Status: SHIPPED | OUTPATIENT
Start: 2022-07-26

## 2022-07-26 RX ORDER — LISINOPRIL 5 MG/1
5 TABLET ORAL DAILY
Qty: 90 TABLET | Refills: 0 | Status: SHIPPED | OUTPATIENT
Start: 2022-07-26 | End: 2022-09-14 | Stop reason: SDUPTHER

## 2022-07-26 RX ORDER — FLUOXETINE 10 MG/1
10 CAPSULE ORAL DAILY
Qty: 90 CAPSULE | Refills: 0 | Status: SHIPPED | OUTPATIENT
Start: 2022-07-26 | End: 2022-09-14 | Stop reason: SDUPTHER

## 2022-07-26 RX ORDER — BUSPIRONE HYDROCHLORIDE 10 MG/1
10 TABLET ORAL 2 TIMES DAILY PRN
Qty: 60 TABLET | Refills: 2 | Status: SHIPPED | OUTPATIENT
Start: 2022-07-26 | End: 2022-09-14 | Stop reason: SDUPTHER

## 2022-07-26 NOTE — PROGRESS NOTES
Shannan Sharp  1985  [id number]  Patient Care Team:  Pamella Andujar APRN as PCP - General (Internal Medicine)  Sudheer Hamlin MD as Obstetrician (Obstetrics and Gynecology)    Shannan Sharp is a pleasant 37 y.o. female who presents for evaluation of Anxiety    Chief Complaint   Patient presents with   • Anxiety       HPI:     Shannan Sharp, a 37-year-old female, presents for follow-up of anxiety.    Anxiety  The patient states that her anxiety has worsened in the last 10 months. In 10/2021, she was promoted to a  and has more duties and added stress. Her aunt also passed away in 2021 and she is still coping with family issues and personal issues. The patient reports struggling to get out of bed in the mornings at times and has the urge to cry. The patient has some trouble sleeping at night and feels overwhelmed.  She denies any alcohol abuse or drug abuse. She has been drinking wine at night and has been taken one or two of Xanax that she was prescribed in 2016 by Dr. Villarreal, during her divorce. The patient notes significant improvement in her symptoms with the Xanax but feels she needs something else. She is planning on traveling for vacation on 08/18/2022, however, she is not looking forward to it because of having to care for her mother and continuing to cope with her aunts death.  She tries to stay active by walking her dog but has complaints of a decreased energy level. The patient is considering seeing a mental health provider again.     The patient has taken melatonin pills and NyQuil prn to help with sleep.     Hypertension  Her blood pressure is elevated today. The patient states she has been dealing with stress for the last 1.5 years and thinks it is contributing to her elevated blood pressure readings. She denies any leg swelling. She had been on lisinopril with good results for a long time but weaned off and her blood pressure got better.    Past Medical  "History:   Diagnosis Date   • Essential hypertension 2019     Past Surgical History:   Procedure Laterality Date   • LAPAROSCOPIC CHOLECYSTECTOMY  2009     Family History   Problem Relation Age of Onset   • Breast cancer Cousin         maternal 1st cousin once removed   • Diabetes Mother    • Hypertension Mother    • Hypertension Father    • Coronary artery disease Maternal Grandmother    • Alzheimer's disease Maternal Grandmother    • Lung cancer Paternal Grandfather      Social History     Tobacco Use   Smoking Status Never Smoker   Smokeless Tobacco Never Used     Allergies   Allergen Reactions   • Erythromycin        Current Outpatient Medications:   •  busPIRone (BUSPAR) 10 MG tablet, Take 1 tablet by mouth 2 (Two) Times a Day As Needed (anxiety)., Disp: 60 tablet, Rfl: 2  •  FLUoxetine (PROzac) 10 MG capsule, Take 1 capsule by mouth Daily., Disp: 90 capsule, Rfl: 0  •  lisinopril (PRINIVIL,ZESTRIL) 5 MG tablet, Take 1 tablet by mouth Daily., Disp: 90 tablet, Rfl: 0  •  Multiple Vitamin (DAILY VITAMIN) tablet, Take 1 tablet by mouth daily., Disp: , Rfl:   •  Tri-Sprintec 0.18/0.215/0.25 MG-35 MCG per tablet, Take 1 tablet by mouth Daily., Disp: 84 tablet, Rfl: 4  •  ALPRAZolam (XANAX) 0.5 MG tablet, Take 1 tablet by mouth 2 (Two) Times a Day As Needed for Anxiety., Disp: 30 tablet, Rfl: 0    Review of Systems  Review of systems was completed, and pertinent findings are noted in the HPI.  /100   Pulse 80   Temp 98 °F (36.7 °C) (Temporal)   Ht 162.6 cm (64.02\")   Wt 75.3 kg (166 lb)   BMI 28.48 kg/m²     Physical Exam  Vitals reviewed.   Constitutional:       Appearance: She is well-developed.   HENT:      Head: Normocephalic and atraumatic.      Comments: *wearing mask  Eyes:      Conjunctiva/sclera: Conjunctivae normal.      Pupils: Pupils are equal, round, and reactive to light.   Cardiovascular:      Rate and Rhythm: Normal rate and regular rhythm.      Pulses: Normal pulses.      Heart sounds: " Normal heart sounds.   Pulmonary:      Effort: Pulmonary effort is normal.      Breath sounds: Normal breath sounds.   Musculoskeletal:         General: Normal range of motion.      Cervical back: Normal range of motion and neck supple.   Skin:     General: Skin is warm and dry.   Neurological:      Mental Status: She is alert and oriented to person, place, and time.   Psychiatric:         Mood and Affect: Mood normal.         Behavior: Behavior normal.         Thought Content: Thought content normal.         Judgment: Judgment normal.         Procedures    PHQ-9 Total Score:      Assessment/Plan:  Diagnoses and all orders for this visit:    1. Essential hypertension (Primary)  -     lisinopril (PRINIVIL,ZESTRIL) 5 MG tablet; Take 1 tablet by mouth Daily.  Dispense: 90 tablet; Refill: 0    2. KOJO (generalized anxiety disorder)  -     busPIRone (BUSPAR) 10 MG tablet; Take 1 tablet by mouth 2 (Two) Times a Day As Needed (anxiety).  Dispense: 60 tablet; Refill: 2  -     FLUoxetine (PROzac) 10 MG capsule; Take 1 capsule by mouth Daily.  Dispense: 90 capsule; Refill: 0  -     ALPRAZolam (XANAX) 0.5 MG tablet; Take 1 tablet by mouth 2 (Two) Times a Day As Needed for Anxiety.  Dispense: 30 tablet; Refill: 0    3. High risk medication use  -     Compliance Drug Analysis, Ur - Urine, Clean Catch; Future    Other orders  -     Discontinue: busPIRone (BUSPAR) 10 MG tablet; Take 1 tablet by mouth 2 (Two) Times a Day As Needed (anxiety).  Dispense: 60 tablet; Refill: 2  -     Discontinue: lisinopril (PRINIVIL,ZESTRIL) 5 MG tablet; Take 1 tablet by mouth Daily.  Dispense: 90 tablet; Refill: 3  -     Discontinue: FLUoxetine (PROzac) 10 MG capsule; Take 1 capsule by mouth Daily.  Dispense: 90 capsule; Refill: 0       Patient Instructions   Call in 2 weeks with update on bp readings.    This patient is on a controlled substance which improves symptoms/quality of life and is aware of the risks, benefits and possible side-effects  current treatment. The patient denies any medication side-effects at this time. A controlled substance agreement will be obtained or is currently on file. We reviewed required monitoring for controlled substances including but not limited to quarterly follow-up visits, annual depression screening, and urine drug screens to which the patient is agreeable. A TAJ report has been or shortly will be reviewed. There are no signs of deviation or misuse.     1. Anxiety  I will prescribe Prozac 10 mg daily for 1 month and depending on how she is feeling we may increase Prozac to 20 mg daily. I will also prescribe alprazolam 0.5 mg as needed and BuSpar BID prn. She will sign a drug agreement today. The patient is aware that she is not able to drive, operate machinery or work while taking the alprazolam.     2. Hypertension  I will prescribe lisinopril 5 mg daily. She will monitor her blood pressure at home. Call or send email in 2 wks with update on readings.    Plan of care reviewed with patient at the conclusion of today's visit. Education was provided regarding diagnosis, management and any prescribed or recommended OTC medications.  Patient verbalizes understanding of and agreement with management plan.    Return in about 6 weeks (around 9/6/2022) for Next scheduled follow up, Labs next visit.    Dictated Utilizing Dragon Dictation.    HUMZA Diaz          Answers for HPI/ROS submitted by the patient on 7/26/2022  Please describe your symptoms.: Anxiety  Have you had these symptoms before?: Yes  How long have you been having these symptoms?: Greater than 2 weeks  Please list any medications you are currently taking for this condition.: N/A  Please describe any probable cause for these symptoms. : Stress  What is the primary reason for your visit?: Other        Transcribed from ambient dictation for HUMZA Diaz by DEANDRE CINTRON.  07/26/22   12:26 EDT    Patient verbalized consent to the visit  recording.

## 2022-07-26 NOTE — PATIENT INSTRUCTIONS
Call in 2 weeks with update on bp readings.    This patient is on a controlled substance which improves symptoms/quality of life and is aware of the risks, benefits and possible side-effects current treatment. The patient denies any medication side-effects at this time. A controlled substance agreement will be obtained or is currently on file. We reviewed required monitoring for controlled substances including but not limited to quarterly follow-up visits, annual depression screening, and urine drug screens to which the patient is agreeable. A TAJ report has been or shortly will be reviewed. There are no signs of deviation or misuse.

## 2022-08-01 LAB — DRUGS UR: NORMAL

## 2022-09-14 ENCOUNTER — OFFICE VISIT (OUTPATIENT)
Dept: INTERNAL MEDICINE | Facility: CLINIC | Age: 37
End: 2022-09-14

## 2022-09-14 VITALS
SYSTOLIC BLOOD PRESSURE: 130 MMHG | HEART RATE: 96 BPM | DIASTOLIC BLOOD PRESSURE: 90 MMHG | WEIGHT: 164 LBS | HEIGHT: 64 IN | BODY MASS INDEX: 28 KG/M2

## 2022-09-14 DIAGNOSIS — I10 ESSENTIAL HYPERTENSION: ICD-10-CM

## 2022-09-14 DIAGNOSIS — F41.1 GAD (GENERALIZED ANXIETY DISORDER): ICD-10-CM

## 2022-09-14 PROCEDURE — 99213 OFFICE O/P EST LOW 20 MIN: CPT | Performed by: NURSE PRACTITIONER

## 2022-09-14 RX ORDER — FLUOXETINE HYDROCHLORIDE 20 MG/1
20 CAPSULE ORAL DAILY
Qty: 90 CAPSULE | Refills: 3 | Status: SHIPPED | OUTPATIENT
Start: 2022-09-14

## 2022-09-14 RX ORDER — HYDROCHLOROTHIAZIDE 25 MG/1
25 TABLET ORAL DAILY
Qty: 90 TABLET | Refills: 3 | Status: SHIPPED | OUTPATIENT
Start: 2022-09-14

## 2022-09-14 RX ORDER — HYDROCHLOROTHIAZIDE 25 MG/1
25 TABLET ORAL DAILY
COMMUNITY
End: 2022-09-14 | Stop reason: SDUPTHER

## 2022-09-14 RX ORDER — BUSPIRONE HYDROCHLORIDE 10 MG/1
10 TABLET ORAL 3 TIMES DAILY PRN
Qty: 90 TABLET | Refills: 2 | Status: SHIPPED | OUTPATIENT
Start: 2022-09-14 | End: 2023-03-03

## 2022-09-14 RX ORDER — LISINOPRIL 10 MG/1
10 TABLET ORAL DAILY
Qty: 90 TABLET | Refills: 3 | Status: SHIPPED | OUTPATIENT
Start: 2022-09-14 | End: 2022-10-10 | Stop reason: SDUPTHER

## 2022-09-14 NOTE — PROGRESS NOTES
Shannan Sharp  1985  [id number]  Patient Care Team:  Pamella Andujar APRN as PCP - General (Internal Medicine)  Sudheer Hamlin MD as Obstetrician (Obstetrics and Gynecology)    Shannan Sharp is a pleasant 37 y.o. female who presents for evaluation of Anxiety (6 week follow up) and Hypertension (6 week follow up)    Chief Complaint   Patient presents with   • Anxiety     6 week follow up   • Hypertension     6 week follow up       HPI:   Shannan Sharp is a 37-year-old female who presents for a follow-up visit.    The patient reports she has a history of anxiety and depression. She states she has taken Xanax twice since her last visit, which has been helpful. She also states she has also been taking Prozac, which has been helpful. The patient has been taking BuSpar 10 mg as needed, which has been helpful. She states that she has been taking it 3 times daily for the past couple of weeks. The patient notes she has been trying to space out her dose to 4 hours at a time. She states that she has not been eating much over the past week and a half.     The patient states she has been taking lisinopril 5 mg daily and hydrochlorothiazide 25 mg daily. She has been checking her blood pressure intermittently at home. she reports her best blood pressure reading at home was 132/83 mmHg The patient states that she has been dealing with a recent break-up with her boyfriend, which may be contributing to her elevated blood pressure.    The patient has been experiencing stress and has not been able to schedule an appointment with a counselor yet. The patient states she has been taking longer walks for exercise.      Past Medical History:   Diagnosis Date   • Essential hypertension 2019     Past Surgical History:   Procedure Laterality Date   • LAPAROSCOPIC CHOLECYSTECTOMY  2009     Family History   Problem Relation Age of Onset   • Breast cancer Cousin         maternal 1st cousin once removed   •  "Diabetes Mother    • Hypertension Mother    • Hypertension Father    • Coronary artery disease Maternal Grandmother    • Alzheimer's disease Maternal Grandmother    • Lung cancer Paternal Grandfather      Social History     Tobacco Use   Smoking Status Never Smoker   Smokeless Tobacco Never Used     Allergies   Allergen Reactions   • Erythromycin        Current Outpatient Medications:   •  ALPRAZolam (XANAX) 0.5 MG tablet, Take 1 tablet by mouth 2 (Two) Times a Day As Needed for Anxiety., Disp: 30 tablet, Rfl: 0  •  busPIRone (BUSPAR) 10 MG tablet, Take 1 tablet by mouth 3 (Three) Times a Day As Needed (anxiety)., Disp: 90 tablet, Rfl: 2  •  FLUoxetine (PROzac) 20 MG capsule, Take 1 capsule by mouth Daily., Disp: 90 capsule, Rfl: 3  •  hydroCHLOROthiazide (HYDRODIURIL) 25 MG tablet, Take 1 tablet by mouth Daily., Disp: 90 tablet, Rfl: 3  •  lisinopril (PRINIVIL,ZESTRIL) 10 MG tablet, Take 1 tablet by mouth Daily., Disp: 90 tablet, Rfl: 3  •  Multiple Vitamin (DAILY VITAMIN) tablet, Take 1 tablet by mouth daily., Disp: , Rfl:   •  Tri-Sprintec 0.18/0.215/0.25 MG-35 MCG per tablet, Take 1 tablet by mouth Daily., Disp: 84 tablet, Rfl: 4    Review of Systems  Review of systems was completed, and pertinent findings are noted in the HPI.  /90   Pulse 96   Ht 162.6 cm (64\")   Wt 74.4 kg (164 lb)   BMI 28.15 kg/m²     Physical Exam  Vitals reviewed.   Constitutional:       Appearance: She is well-developed.   Pulmonary:      Effort: Pulmonary effort is normal.   Skin:     General: Skin is warm and dry.   Neurological:      Mental Status: She is alert.   Psychiatric:         Attention and Perception: Attention normal.         Mood and Affect: Mood normal.         Speech: Speech normal.         Behavior: Behavior normal.         Thought Content: Thought content normal.         Cognition and Memory: Cognition normal.         Procedures    PHQ-9 Total Score:      Assessment/Plan:  Diagnoses and all orders for this " visit:    1. Essential hypertension  -     lisinopril (PRINIVIL,ZESTRIL) 10 MG tablet; Take 1 tablet by mouth Daily.  Dispense: 90 tablet; Refill: 3  -     hydroCHLOROthiazide (HYDRODIURIL) 25 MG tablet; Take 1 tablet by mouth Daily.  Dispense: 90 tablet; Refill: 3    2. KOJO (generalized anxiety disorder)  -     busPIRone (BUSPAR) 10 MG tablet; Take 1 tablet by mouth 3 (Three) Times a Day As Needed (anxiety).  Dispense: 90 tablet; Refill: 2  -     FLUoxetine (PROzac) 20 MG capsule; Take 1 capsule by mouth Daily.  Dispense: 90 capsule; Refill: 3       1.Hypertension  She will continue Hydrochlorothiazide 25 mg and Buspar 10mg. I increased her lisinopril to 10mg daily and refilled Buspar to take 3 times daily.    2.Anxiety and Depression  She is doing well with her medications. She will increase her current Prozac to 20mg, will double up current dosage until the increased dosage comes in the mail order for her.  I have sent a referral to chiropractic massage for her. She will continue to exercise and eat healthy.    There are no Patient Instructions on file for this visit.  Plan of care reviewed with patient at the conclusion of today's visit. Education was provided regarding diagnosis, management and any prescribed or recommended OTC medications.  Patient verbalizes understanding of and agreement with management plan.    Return in about 6 weeks (around 10/26/2022) for Recheck.      Answers for HPI/ROS submitted by the patient on 9/13/2022  What is the primary reason for your visit?: High Blood Pressure    Transcribed from ambient dictation for HUMZA Diaz by RAMIREZ JOSE.  09/14/22   17:13 EDT    Patient verbalized consent to the visit recording.

## 2022-10-10 ENCOUNTER — OFFICE VISIT (OUTPATIENT)
Dept: INTERNAL MEDICINE | Facility: CLINIC | Age: 37
End: 2022-10-10

## 2022-10-10 VITALS
HEIGHT: 64 IN | WEIGHT: 161 LBS | BODY MASS INDEX: 27.49 KG/M2 | DIASTOLIC BLOOD PRESSURE: 90 MMHG | SYSTOLIC BLOOD PRESSURE: 122 MMHG | TEMPERATURE: 98.4 F | HEART RATE: 92 BPM

## 2022-10-10 DIAGNOSIS — E78.2 MIXED HYPERLIPIDEMIA: ICD-10-CM

## 2022-10-10 DIAGNOSIS — F41.8 SITUATIONAL ANXIETY: ICD-10-CM

## 2022-10-10 DIAGNOSIS — E55.9 VITAMIN D DEFICIENCY: ICD-10-CM

## 2022-10-10 DIAGNOSIS — I10 ESSENTIAL HYPERTENSION: Primary | ICD-10-CM

## 2022-10-10 PROCEDURE — 90471 IMMUNIZATION ADMIN: CPT | Performed by: NURSE PRACTITIONER

## 2022-10-10 PROCEDURE — 99214 OFFICE O/P EST MOD 30 MIN: CPT | Performed by: NURSE PRACTITIONER

## 2022-10-10 PROCEDURE — 90686 IIV4 VACC NO PRSV 0.5 ML IM: CPT | Performed by: NURSE PRACTITIONER

## 2022-10-10 RX ORDER — LISINOPRIL 20 MG/1
20 TABLET ORAL DAILY
Qty: 90 TABLET | Refills: 1 | Status: SHIPPED | OUTPATIENT
Start: 2022-10-10 | End: 2023-02-13

## 2022-10-10 NOTE — PROGRESS NOTES
"Shannan Sharp  1985  [id number]  Patient Care Team:  Pamella Andujar APRN as PCP - General (Internal Medicine)  Sudheer Hamlin MD as Obstetrician (Obstetrics and Gynecology)    Shannan Sharp is a pleasant 37 y.o. female who presents for evaluation of Hypertension and Anxiety    Chief Complaint   Patient presents with   • Hypertension   • Anxiety       HPI:   The patient is a 37-year-old female who presents today for a follow-up visit.     BP in office today 122/90. The patient reports that she has been monitoring her blood pressure at home. She states that her blood pressure has been averaging 130/90 mmHg. She notes \"a couple\" at-home blood pressure readings of 130/83 mmHg. The patient's blood pressure in the office today was initially 122/90 mmHg and was slightly higher on repeat blood pressure monitoring.  She is currently taking hydrochlorothiazide 25 mg daily and lisinopril 10 mg daily.    The patient reports that her stress level has improved. She states that she recently returned home from a vacation. She notes that she required BuSpar less often than usual while she was on vacation last week. The patient feels that her current BuSpar dose is adequate. She denies any side effects with the increase of her fluoxetine to 20 mg daily.    The patient reports continued difficulties with awakening each night at approximately 3:00 a.m. or 4:00 a.m. She notes that she is not taking BuSpar at night. She states that she has not taken Xanax since her last visit in this office, though she notes she has Xanax available. Her fluoxetine was previously increased to 20 mg daily in an attempt to improve her stress level and sleep pattern. The patient denies taking any over-the-counter sleep aids regularly. She states that she has taken melatonin gummies in the past but discontinued these a while ago. She reports that occasionally if she is unable to fall back to sleep, she will take NyQuil, which " is helpful, though she notes morning drowsiness following the use of NyQuil. She denies drinking alcohol close to bedtime and states that she occasionally drinks 1 glass of wine with dinner. The patient reports that she does not drink alcohol frequently in general. She has recently noted a tendency to fall asleep earlier in the night, which may have altered her sleep pattern.    The patient received her influenza vaccine today.    She states that she has been trying to walk more. She notes that she completed 4-mile and 5-mile hikes last week while on vacation.  Past Medical History:   Diagnosis Date   • Essential hypertension 2019     Past Surgical History:   Procedure Laterality Date   • LAPAROSCOPIC CHOLECYSTECTOMY  2009     Family History   Problem Relation Age of Onset   • Breast cancer Cousin         maternal 1st cousin once removed   • Diabetes Mother    • Hypertension Mother    • Hypertension Father    • Coronary artery disease Maternal Grandmother    • Alzheimer's disease Maternal Grandmother    • Lung cancer Paternal Grandfather      Social History     Tobacco Use   Smoking Status Never   Smokeless Tobacco Never     Allergies   Allergen Reactions   • Erythromycin        Current Outpatient Medications:   •  ALPRAZolam (XANAX) 0.5 MG tablet, Take 1 tablet by mouth 2 (Two) Times a Day As Needed for Anxiety., Disp: 30 tablet, Rfl: 0  •  busPIRone (BUSPAR) 10 MG tablet, Take 1 tablet by mouth 3 (Three) Times a Day As Needed (anxiety)., Disp: 90 tablet, Rfl: 2  •  FLUoxetine (PROzac) 20 MG capsule, Take 1 capsule by mouth Daily., Disp: 90 capsule, Rfl: 3  •  hydroCHLOROthiazide (HYDRODIURIL) 25 MG tablet, Take 1 tablet by mouth Daily., Disp: 90 tablet, Rfl: 3  •  lisinopril (PRINIVIL,ZESTRIL) 20 MG tablet, Take 1 tablet by mouth Daily., Disp: 90 tablet, Rfl: 1  •  Multiple Vitamin (DAILY VITAMIN) tablet, Take 1 tablet by mouth daily., Disp: , Rfl:   •  Tri-Sprintec 0.18/0.215/0.25 MG-35 MCG per tablet, Take 1  "tablet by mouth Daily., Disp: 84 tablet, Rfl: 4    Review of Systems  Review of systems was completed, and pertinent findings are noted in the HPI.  /90 (BP Location: Left arm, Patient Position: Sitting, Cuff Size: Adult)   Pulse 92   Temp 98.4 °F (36.9 °C) (Temporal)   Ht 162.6 cm (64.02\")   Wt 73 kg (161 lb)   BMI 27.62 kg/m²     Physical Exam  Constitutional:       Appearance: She is well-developed.   HENT:      Head: Normocephalic and atraumatic.      Comments: *wearing mask  Eyes:      Conjunctiva/sclera: Conjunctivae normal.      Pupils: Pupils are equal, round, and reactive to light.   Cardiovascular:      Rate and Rhythm: Normal rate and regular rhythm.      Pulses: Normal pulses.      Heart sounds: Normal heart sounds.   Pulmonary:      Effort: Pulmonary effort is normal.      Breath sounds: Normal breath sounds.   Musculoskeletal:         General: Normal range of motion.      Cervical back: Normal range of motion and neck supple.   Skin:     General: Skin is warm and dry.   Neurological:      Mental Status: She is alert and oriented to person, place, and time.   Psychiatric:         Mood and Affect: Mood normal.         Behavior: Behavior normal.         Thought Content: Thought content normal.         Judgment: Judgment normal.         Procedures    PHQ-9 Total Score:      Assessment/Plan:  Diagnoses and all orders for this visit:    1. Essential hypertension (Primary)  -     CBC & Differential; Future  -     Comprehensive Metabolic Panel; Future  -     Microalbumin / Creatinine Urine Ratio - Urine, Clean Catch; Future  -     lisinopril (PRINIVIL,ZESTRIL) 20 MG tablet; Take 1 tablet by mouth Daily.  Dispense: 90 tablet; Refill: 1    2. Situational anxiety  -     TSH Rfx On Abnormal To Free T4; Future    3. Mixed hyperlipidemia  -     Lipid Panel; Future    4. Vitamin D deficiency  -     Vitamin D 25 Hydroxy; Future    Other orders  -     FluLaval/Fluarix/Fluzone >6 Months      1. " Hypertension  Comments:  The patient lisinopril will be increased to 20 mg daily. If her diastolic blood pressure does not decrease to below 80 mmHg with the increase in lisinopril, consideration will be given to further increasing her lisinopril or changing her hydrochlorothiazide 25 mg daily to chlorthalidone 25 mg daily. She will monitor her blood pressure and contact us in approximately 2 weeks to discuss her blood pressure readings and medication.    2. Anxiety  Comments:  The patient will continue Prozac 20 mg daily and buspirone prn.    3. Insomnia  Comments:  The patient was encouraged to resume taking melatonin gummies. She was advised to avoid drinking alcohol close to bedtime, to try maintaining the same bedtime each night, and to avoid screen time for approximately 2 hours prior to bedtime.    4. Health maintenance  Comments:  Fasting laboratory studies will be performed for the patient, including renal function and a lipid panel. She will follow up in the office in 3 months.     There are no Patient Instructions on file for this visit.  Plan of care reviewed with patient at the conclusion of today's visit. Education was provided regarding diagnosis, management and any prescribed or recommended OTC medications.  Patient verbalizes understanding of and agreement with management plan.    Return in about 3 months (around 1/10/2023).    Dictated Utilizing Dragon Dictation.    HUMZA Diaz          Answers for HPI/ROS submitted by the patient on 10/10/2022  What is the primary reason for your visit?: High Blood Pressure    Transcribed from ambient dictation for HUMZA Diaz by Luda Salinas.  10/10/22   17:26 EDT    Patient or patient representative verbalized consent to the visit recording.  I have personally performed the services described in this document as transcribed by the above individual, and it is both accurate and complete.

## 2022-10-12 RX ORDER — NORGESTIMATE AND ETHINYL ESTRADIOL 7DAYSX3 28
1 KIT ORAL DAILY
Qty: 84 TABLET | Refills: 0 | Status: SHIPPED | OUTPATIENT
Start: 2022-10-12 | End: 2022-11-08 | Stop reason: SDUPTHER

## 2022-11-08 ENCOUNTER — OFFICE VISIT (OUTPATIENT)
Dept: OBSTETRICS AND GYNECOLOGY | Facility: CLINIC | Age: 37
End: 2022-11-08

## 2022-11-08 VITALS
SYSTOLIC BLOOD PRESSURE: 124 MMHG | WEIGHT: 161 LBS | DIASTOLIC BLOOD PRESSURE: 80 MMHG | RESPIRATION RATE: 14 BRPM | BODY MASS INDEX: 27.62 KG/M2

## 2022-11-08 DIAGNOSIS — Z01.419 WELL WOMAN EXAM WITH ROUTINE GYNECOLOGICAL EXAM: Primary | ICD-10-CM

## 2022-11-08 DIAGNOSIS — Z11.3 SCREENING FOR VENEREAL DISEASE: ICD-10-CM

## 2022-11-08 DIAGNOSIS — Z71.85 VACCINE COUNSELING: ICD-10-CM

## 2022-11-08 PROBLEM — E78.2 MIXED HYPERLIPIDEMIA: Status: RESOLVED | Noted: 2019-02-23 | Resolved: 2022-11-08

## 2022-11-08 PROCEDURE — 99395 PREV VISIT EST AGE 18-39: CPT | Performed by: OBSTETRICS & GYNECOLOGY

## 2022-11-08 RX ORDER — NORGESTIMATE AND ETHINYL ESTRADIOL 7DAYSX3 28
1 KIT ORAL DAILY
Qty: 84 TABLET | Refills: 4 | Status: SHIPPED | OUTPATIENT
Start: 2022-11-08

## 2022-11-08 NOTE — PROGRESS NOTES
Subjective   Chief Complaint   Patient presents with   • Gynecologic Exam     Shannan Sharp is a 37 y.o. year old  presenting to be seen for her annual exam.     SEXUAL Hx:  She is not currently sexually active.  In the past year there there has been MORE THAN ONE new sexual partner.    Condoms are never used.  She would like to be screened for STD's at today's exam.  Current birth control method: abstinence and OCP (estrogen/progesterone).  She is happy with her current method of contraception and does not want to discuss alternative methods of contraception.  MENSTRUAL Hx:  Patient's last menstrual period was 10/19/2022 (approximate).  In the past 6 months her cycles have been regular, predictable and occur monthly.  Her menstrual flow is typically normal.   Each month on average there are roughly 0 day(s) of very heavy flow.  Intermenstrual bleeding is absent.    Post-coital bleeding is n/a.  Dysmenorrhea: is not affecting her activities of daily living  PMS: is not affecting her activities of daily living  Her cycles are not a source of concern for her that she wishes to discuss today.  HEALTH Hx:  She exercises regularly: yes.  She wears her seat belt: yes.  She has concerns about domestic violence: no.  OTHER THINGS SHE WANTS TO DISCUSS TODAY:  Nothing else    The following portions of the patient's history were reviewed and updated as appropriate:problem list, current medications, allergies, past family history, past medical history, past social history and past surgical history.    Social History    Tobacco Use      Smoking status: Never      Smokeless tobacco: Never    Review of Systems  Constitutional POS: nothing reported    NEG: anorexia or night sweats   Genitourinary POS: nothing reported    NEG: dysuria or hematuria      Gastointestinal POS: nothing reported    NEG: bloating, change in bowel habits, melena or reflux symptoms   Integument POS: nothing reported and she does not routinely  see a dermatologist for screening skin exams    NEG: moles that are changing in size, shape, color or rashes   Breast POS: nothing reported    NEG: persistent breast lump, skin dimpling or nipple discharge        Objective   /80   Resp 14   Wt 73 kg (161 lb)   LMP 10/19/2022 (Approximate)   BMI 27.62 kg/m²     General:  well developed; well nourished  no acute distress   Skin:  No suspicious lesions seen   Thyroid: normal to inspection and palpation   Breasts:  Examined in supine position  Symmetric without masses or skin dimpling  Nipples normal without inversion, lesions or discharge  There are no palpable axillary nodes   Abdomen: soft, non-tender; no masses  no umbilical or inguinal hernias are present  no hepato-splenomegaly   Pelvis: Clinical staff was present for exam  External genitalia:  normal appearance of the external genitalia including Bartholin's and [place]'s glands.  :  urethral meatus normal;  Vaginal:  normal pink mucosa without prolapse or lesions.  Cervix:  normal appearance.  Uterus:  normal size, shape and consistency.  Adnexa:  normal bimanual exam of the adnexa.  Rectal:  digital rectal exam not performed; anus visually normal appearing.        Assessment   1. Normal GYN exam  2. She is up to date on all relevant gynecologic and colorectal screenings     Plan   1. Pap was not done today.  I explained to Shannan that the recommendations for Pap smear interval in a low risk patient has lengthened to 3 years time.  I told Shannan she still needs to be seen in our office yearly for a full physical including breast and pelvic exam.  2. The following tests were ordered today: STD swabs for GC, chlamydia and trichimoniasis.  It was explained to Shannan that all lab test should be back within the one week after they are performed. She will be notified about the results, regardless of the findings. If she has not been contacted by the office within 2 weeks after the test has been performed, it is  her responsibility to contact us to learn about her results.   3. Her vaccine record was reviewed and updated.  4. The importance of keeping all planned follow-up and taking all medications as prescribed was emphasized.  5. Follow up for annual exam 1 year    New Medications Ordered This Visit   Medications   • Tri-Sprintec 0.18/0.215/0.25 MG-35 MCG per tablet     Sig: Take 1 tablet by mouth Daily.     Dispense:  84 tablet     Refill:  4     Requesting 1 year supply          This note was electronically signed.    Sudheer Hamlin M.D.  November 8, 2022    Part of this note may be an electronic transcription/translation of spoken language to printed text using the Dragon Dictation System.

## 2023-01-06 ENCOUNTER — LAB (OUTPATIENT)
Dept: LAB | Facility: HOSPITAL | Age: 38
End: 2023-01-06
Payer: COMMERCIAL

## 2023-01-06 DIAGNOSIS — E78.2 MIXED HYPERLIPIDEMIA: ICD-10-CM

## 2023-01-06 DIAGNOSIS — F41.8 SITUATIONAL ANXIETY: ICD-10-CM

## 2023-01-06 DIAGNOSIS — I10 ESSENTIAL HYPERTENSION: ICD-10-CM

## 2023-01-06 DIAGNOSIS — E55.9 VITAMIN D DEFICIENCY: ICD-10-CM

## 2023-01-06 LAB
25(OH)D3 SERPL-MCNC: 27.5 NG/ML (ref 30–100)
ALBUMIN SERPL-MCNC: 4.7 G/DL (ref 3.5–5.2)
ALBUMIN UR-MCNC: <1.2 MG/DL
ALBUMIN/GLOB SERPL: 1.7 G/DL
ALP SERPL-CCNC: 50 U/L (ref 39–117)
ALT SERPL W P-5'-P-CCNC: 35 U/L (ref 1–33)
ANION GAP SERPL CALCULATED.3IONS-SCNC: 9.4 MMOL/L (ref 5–15)
AST SERPL-CCNC: 20 U/L (ref 1–32)
BASOPHILS # BLD AUTO: 0.08 10*3/MM3 (ref 0–0.2)
BASOPHILS NFR BLD AUTO: 0.7 % (ref 0–1.5)
BILIRUB SERPL-MCNC: 0.5 MG/DL (ref 0–1.2)
BUN SERPL-MCNC: 13 MG/DL (ref 6–20)
BUN/CREAT SERPL: 17.1 (ref 7–25)
CALCIUM SPEC-SCNC: 9.3 MG/DL (ref 8.6–10.5)
CHLORIDE SERPL-SCNC: 100 MMOL/L (ref 98–107)
CHOLEST SERPL-MCNC: 225 MG/DL (ref 0–200)
CO2 SERPL-SCNC: 27.6 MMOL/L (ref 22–29)
CREAT SERPL-MCNC: 0.76 MG/DL (ref 0.57–1)
CREAT UR-MCNC: 133.4 MG/DL
DEPRECATED RDW RBC AUTO: 37.9 FL (ref 37–54)
EGFRCR SERPLBLD CKD-EPI 2021: 104.3 ML/MIN/1.73
EOSINOPHIL # BLD AUTO: 0.57 10*3/MM3 (ref 0–0.4)
EOSINOPHIL NFR BLD AUTO: 4.8 % (ref 0.3–6.2)
ERYTHROCYTE [DISTWIDTH] IN BLOOD BY AUTOMATED COUNT: 12.1 % (ref 12.3–15.4)
GLOBULIN UR ELPH-MCNC: 2.8 GM/DL
GLUCOSE SERPL-MCNC: 80 MG/DL (ref 65–99)
HCT VFR BLD AUTO: 40.9 % (ref 34–46.6)
HDLC SERPL-MCNC: 63 MG/DL (ref 40–60)
HGB BLD-MCNC: 14.4 G/DL (ref 12–15.9)
IMM GRANULOCYTES # BLD AUTO: 0.03 10*3/MM3 (ref 0–0.05)
IMM GRANULOCYTES NFR BLD AUTO: 0.3 % (ref 0–0.5)
LDLC SERPL CALC-MCNC: 136 MG/DL (ref 0–100)
LDLC/HDLC SERPL: 2.1 {RATIO}
LYMPHOCYTES # BLD AUTO: 4.74 10*3/MM3 (ref 0.7–3.1)
LYMPHOCYTES NFR BLD AUTO: 40.2 % (ref 19.6–45.3)
MCH RBC QN AUTO: 30.7 PG (ref 26.6–33)
MCHC RBC AUTO-ENTMCNC: 35.2 G/DL (ref 31.5–35.7)
MCV RBC AUTO: 87.2 FL (ref 79–97)
MICROALBUMIN/CREAT UR: NORMAL MG/G{CREAT}
MONOCYTES # BLD AUTO: 0.55 10*3/MM3 (ref 0.1–0.9)
MONOCYTES NFR BLD AUTO: 4.7 % (ref 5–12)
NEUTROPHILS NFR BLD AUTO: 49.3 % (ref 42.7–76)
NEUTROPHILS NFR BLD AUTO: 5.81 10*3/MM3 (ref 1.7–7)
NRBC BLD AUTO-RTO: 0 /100 WBC (ref 0–0.2)
PLATELET # BLD AUTO: 412 10*3/MM3 (ref 140–450)
PMV BLD AUTO: 9 FL (ref 6–12)
POTASSIUM SERPL-SCNC: 3.5 MMOL/L (ref 3.5–5.2)
PROT SERPL-MCNC: 7.5 G/DL (ref 6–8.5)
RBC # BLD AUTO: 4.69 10*6/MM3 (ref 3.77–5.28)
SODIUM SERPL-SCNC: 137 MMOL/L (ref 136–145)
TRIGL SERPL-MCNC: 148 MG/DL (ref 0–150)
TSH SERPL DL<=0.05 MIU/L-ACNC: 2.86 UIU/ML (ref 0.27–4.2)
VLDLC SERPL-MCNC: 26 MG/DL (ref 5–40)
WBC NRBC COR # BLD: 11.78 10*3/MM3 (ref 3.4–10.8)

## 2023-01-06 PROCEDURE — 84443 ASSAY THYROID STIM HORMONE: CPT

## 2023-01-06 PROCEDURE — 80053 COMPREHEN METABOLIC PANEL: CPT

## 2023-01-06 PROCEDURE — 82570 ASSAY OF URINE CREATININE: CPT

## 2023-01-06 PROCEDURE — 82043 UR ALBUMIN QUANTITATIVE: CPT

## 2023-01-06 PROCEDURE — 80061 LIPID PANEL: CPT

## 2023-01-06 PROCEDURE — 85025 COMPLETE CBC W/AUTO DIFF WBC: CPT

## 2023-01-06 PROCEDURE — 82306 VITAMIN D 25 HYDROXY: CPT

## 2023-01-11 ENCOUNTER — OFFICE VISIT (OUTPATIENT)
Dept: INTERNAL MEDICINE | Facility: CLINIC | Age: 38
End: 2023-01-11
Payer: COMMERCIAL

## 2023-01-11 ENCOUNTER — LAB (OUTPATIENT)
Dept: LAB | Facility: HOSPITAL | Age: 38
End: 2023-01-11
Payer: COMMERCIAL

## 2023-01-11 VITALS
TEMPERATURE: 98.2 F | BODY MASS INDEX: 29.37 KG/M2 | HEIGHT: 64 IN | HEART RATE: 84 BPM | WEIGHT: 172 LBS | DIASTOLIC BLOOD PRESSURE: 82 MMHG | SYSTOLIC BLOOD PRESSURE: 114 MMHG

## 2023-01-11 DIAGNOSIS — D72.829 LEUKOCYTOSIS, UNSPECIFIED TYPE: ICD-10-CM

## 2023-01-11 DIAGNOSIS — J06.9 VIRAL URI: ICD-10-CM

## 2023-01-11 DIAGNOSIS — I10 ESSENTIAL HYPERTENSION: Primary | ICD-10-CM

## 2023-01-11 LAB
BILIRUB UR QL STRIP: NEGATIVE
CLARITY UR: CLEAR
COLOR UR: YELLOW
GLUCOSE UR STRIP-MCNC: NEGATIVE MG/DL
HGB UR QL STRIP.AUTO: NEGATIVE
KETONES UR QL STRIP: NEGATIVE
LEUKOCYTE ESTERASE UR QL STRIP.AUTO: NEGATIVE
NITRITE UR QL STRIP: NEGATIVE
PH UR STRIP.AUTO: 7 [PH] (ref 5–8)
PROT UR QL STRIP: NEGATIVE
SP GR UR STRIP: 1.01 (ref 1–1.03)
UROBILINOGEN UR QL STRIP: NORMAL

## 2023-01-11 PROCEDURE — 81003 URINALYSIS AUTO W/O SCOPE: CPT

## 2023-01-11 PROCEDURE — 85007 BL SMEAR W/DIFF WBC COUNT: CPT

## 2023-01-11 PROCEDURE — 99214 OFFICE O/P EST MOD 30 MIN: CPT | Performed by: NURSE PRACTITIONER

## 2023-01-11 PROCEDURE — 85025 COMPLETE CBC W/AUTO DIFF WBC: CPT

## 2023-01-11 RX ORDER — FLUOROMETHOLONE 0.1 %
1 SUSPENSION, DROPS(FINAL DOSAGE FORM)(ML) OPHTHALMIC (EYE) DAILY
COMMUNITY
Start: 2022-11-30

## 2023-01-11 NOTE — PROGRESS NOTES
"Shannan Sharp  1985  [id number]  Patient Care Team:  Pamella Andujar APRN as PCP - General (Internal Medicine)  Sudheer Hamlin MD as Obstetrician (Obstetrics and Gynecology)    Shannan Shrap is a pleasant 37 y.o. female who presents for evaluation of Hypertension    Chief Complaint   Patient presents with   • Hypertension       HPI:   Shannan is here to follow up on hypertension.  She has a pertinent medical history including hypertension and anxiety.      BP in office today 122/90. The patient reports that she has been monitoring her blood pressure at home and it has improved. She states that her blood pressure has been averaging 130/90 mmHg. She notes \"a couple\" at-home blood pressure readings of 130/83 mmHg. The patient's blood pressure in the office today was initially 122/90 mmHg and was slightly higher on repeat blood pressure monitoring.  She is currently taking hydrochlorothiazide 25 mg daily and lisinopril 20 mg daily.    The patient reports that she is feeling well. She admits that her blood pressure has been consistently in the 80 to 82 range at home. Her lisinopril was increased to 20 mg at her last visit. She admitsshe is currently taking lisinopril 20 mg and hydrochlorothiazide 25 mg.    The patient denies any recent illness or upper respiratory infection. She states that she has been congested, especially when she sleeps at night and when she wakes up in the morning. She reports that she had some drainage and coughed up phlegm, however it did not look infected. She denies any urinary symptoms.    The patient denies any leg swelling. She denies any palpitations or irregular heartbeats.    Recent labs show WBC 11.78.  She has not been on steroids.  Reports URI is relatively mild and improving.  Past Medical History:   Diagnosis Date   • Essential hypertension 2019   • Mixed hyperlipidemia 2019   • Situational anxiety 2022     Past Surgical History:   Procedure Laterality " "Date   • LAPAROSCOPIC CHOLECYSTECTOMY  2009     Family History   Problem Relation Age of Onset   • Hypertension Father    • Diabetes Mother    • Hypertension Mother    • Lung cancer Paternal Grandfather    • Coronary artery disease Maternal Grandmother    • Alzheimer's disease Maternal Grandmother      Social History     Tobacco Use   Smoking Status Never   Smokeless Tobacco Never     Allergies   Allergen Reactions   • Erythromycin        Current Outpatient Medications:   •  ALPRAZolam (XANAX) 0.5 MG tablet, Take 1 tablet by mouth 2 (Two) Times a Day As Needed for Anxiety., Disp: 30 tablet, Rfl: 0  •  busPIRone (BUSPAR) 10 MG tablet, Take 1 tablet by mouth 3 (Three) Times a Day As Needed (anxiety)., Disp: 90 tablet, Rfl: 2  •  fluorometholone (FML) 0.1 % ophthalmic suspension, Administer 1 drop to both eyes Daily., Disp: , Rfl:   •  FLUoxetine (PROzac) 20 MG capsule, Take 1 capsule by mouth Daily., Disp: 90 capsule, Rfl: 3  •  hydroCHLOROthiazide (HYDRODIURIL) 25 MG tablet, Take 1 tablet by mouth Daily., Disp: 90 tablet, Rfl: 3  •  lisinopril (PRINIVIL,ZESTRIL) 20 MG tablet, Take 1 tablet by mouth Daily., Disp: 90 tablet, Rfl: 1  •  Multiple Vitamin (DAILY VITAMIN) tablet, Take 1 tablet by mouth daily., Disp: , Rfl:   •  Tri-Sprintec 0.18/0.215/0.25 MG-35 MCG per tablet, Take 1 tablet by mouth Daily., Disp: 84 tablet, Rfl: 4  •  doxycycline (VIBRAMYICN) 100 MG tablet, Take 1 tablet by mouth 2 (Two) Times a Day., Disp: 14 tablet, Rfl: 0    Review of Systems  Review of systems was completed, and pertinent findings are noted in the HPI.  /82 (BP Location: Left arm, Patient Position: Sitting, Cuff Size: Adult)   Pulse 84   Temp 98.2 °F (36.8 °C) (Temporal)   Ht 162.6 cm (64.02\")   Wt 78 kg (172 lb)   BMI 29.51 kg/m²     Physical Exam  Constitutional:       Appearance: She is well-developed and overweight.   HENT:      Head: Normocephalic and atraumatic.      Comments: *wearing mask  Eyes:      " Conjunctiva/sclera: Conjunctivae normal.      Pupils: Pupils are equal, round, and reactive to light.   Cardiovascular:      Rate and Rhythm: Normal rate and regular rhythm.      Pulses: Normal pulses.      Heart sounds: Normal heart sounds.   Pulmonary:      Effort: Pulmonary effort is normal.      Breath sounds: Normal breath sounds.   Musculoskeletal:         General: Normal range of motion.      Cervical back: Normal range of motion and neck supple.   Skin:     General: Skin is warm and dry.   Neurological:      Mental Status: She is alert and oriented to person, place, and time.   Psychiatric:         Mood and Affect: Mood normal.         Behavior: Behavior normal.         Thought Content: Thought content normal.         Judgment: Judgment normal.         Procedures    PHQ-9 Total Score:      Assessment/Plan:  Diagnoses and all orders for this visit:    1. Essential hypertension (Primary)    2. Leukocytosis, unspecified type  -     CBC & Differential; Future  -     Urinalysis With Culture If Indicated - Urine, Clean Catch; Future    3. Viral URI     1. Hypertension  Comments:  The patient's blood pressure is well controlled at this time. She will continue her current medication regimen.    2. Hyperlipidemia  Comments:  The patient will continue her current medication regimen.    3. Leukocytosis  Comments:  We will recheck her white blood cell count today.    There are no Patient Instructions on file for this visit.  Plan of care reviewed with patient at the conclusion of today's visit. Education was provided regarding diagnosis, management and any prescribed or recommended OTC medications.  Patient verbalizes understanding of and agreement with management plan.    Return in about 6 months (around 7/11/2023) for Annual physical, Labs this visit, Labs next visit.    Dictated Utilizing Dragon Dictation.    HUMZA Diaz    Transcribed from ambient dictation for HUMZA Diaz by Lorna  Major.  01/11/23   15:16 EST    Patient or patient representative verbalized consent to the visit recording.  I have personally performed the services described in this document as transcribed by the above individual, and it is both accurate and complete.  Lorna Fernández

## 2023-01-12 ENCOUNTER — TELEPHONE (OUTPATIENT)
Dept: INTERNAL MEDICINE | Facility: CLINIC | Age: 38
End: 2023-01-12
Payer: COMMERCIAL

## 2023-01-12 ENCOUNTER — HOSPITAL ENCOUNTER (OUTPATIENT)
Dept: GENERAL RADIOLOGY | Facility: HOSPITAL | Age: 38
Discharge: HOME | End: 2023-01-12
Admitting: NURSE PRACTITIONER
Payer: COMMERCIAL

## 2023-01-12 DIAGNOSIS — J06.9 UPPER RESPIRATORY TRACT INFECTION, UNSPECIFIED TYPE: Primary | ICD-10-CM

## 2023-01-12 DIAGNOSIS — J06.9 UPPER RESPIRATORY TRACT INFECTION, UNSPECIFIED TYPE: ICD-10-CM

## 2023-01-12 LAB
BASOPHILS # BLD MANUAL: 0.14 10*3/MM3 (ref 0–0.2)
BASOPHILS NFR BLD MANUAL: 1 % (ref 0–1.5)
DACRYOCYTES BLD QL SMEAR: ABNORMAL
DEPRECATED RDW RBC AUTO: 39.8 FL (ref 37–54)
EOSINOPHIL # BLD MANUAL: 0.9 10*3/MM3 (ref 0–0.4)
EOSINOPHIL NFR BLD MANUAL: 6.3 % (ref 0.3–6.2)
ERYTHROCYTE [DISTWIDTH] IN BLOOD BY AUTOMATED COUNT: 12.1 % (ref 12.3–15.4)
HCT VFR BLD AUTO: 43 % (ref 34–46.6)
HGB BLD-MCNC: 14.8 G/DL (ref 12–15.9)
LYMPHOCYTES # BLD MANUAL: 5.93 10*3/MM3 (ref 0.7–3.1)
LYMPHOCYTES NFR BLD MANUAL: 3.1 % (ref 5–12)
MCH RBC QN AUTO: 31 PG (ref 26.6–33)
MCHC RBC AUTO-ENTMCNC: 34.4 G/DL (ref 31.5–35.7)
MCV RBC AUTO: 90.1 FL (ref 79–97)
MONOCYTES # BLD: 0.44 10*3/MM3 (ref 0.1–0.9)
NEUTROPHILS # BLD AUTO: 6.81 10*3/MM3 (ref 1.7–7)
NEUTROPHILS NFR BLD MANUAL: 47.9 % (ref 42.7–76)
PLAT MORPH BLD: NORMAL
PLATELET # BLD AUTO: 489 10*3/MM3 (ref 140–450)
PMV BLD AUTO: 9.3 FL (ref 6–12)
RBC # BLD AUTO: 4.77 10*6/MM3 (ref 3.77–5.28)
VARIANT LYMPHS NFR BLD MANUAL: 41.7 % (ref 19.6–45.3)
WBC MORPH BLD: NORMAL
WBC NRBC COR # BLD: 14.21 10*3/MM3 (ref 3.4–10.8)

## 2023-01-12 PROCEDURE — 71046 X-RAY EXAM CHEST 2 VIEWS: CPT

## 2023-01-12 RX ORDER — DOXYCYCLINE HYCLATE 100 MG
100 TABLET ORAL 2 TIMES DAILY
Qty: 14 TABLET | Refills: 0 | Status: SHIPPED | OUTPATIENT
Start: 2023-01-12 | End: 2023-03-17 | Stop reason: SDUPTHER

## 2023-01-12 NOTE — TELEPHONE ENCOUNTER
Repeat WBC is higher than last week.  She has mild respiratory sx but I would like to do a cxr for further evaluation.  Can we call and see if she can get that done today?  I would like to start her on an antibiotic as well.  Have her follow up in office with someone end of next week for recheck and repeat labs.

## 2023-01-12 NOTE — TELEPHONE ENCOUNTER
Pt advised and verbalized understanding. She will have CXR done today. Scheduled with Pamella per pt request next Wednesday.

## 2023-01-13 ENCOUNTER — TELEPHONE (OUTPATIENT)
Dept: INTERNAL MEDICINE | Facility: CLINIC | Age: 38
End: 2023-01-13
Payer: COMMERCIAL

## 2023-01-18 ENCOUNTER — OFFICE VISIT (OUTPATIENT)
Dept: INTERNAL MEDICINE | Facility: CLINIC | Age: 38
End: 2023-01-18
Payer: COMMERCIAL

## 2023-01-18 VITALS
HEART RATE: 80 BPM | DIASTOLIC BLOOD PRESSURE: 76 MMHG | SYSTOLIC BLOOD PRESSURE: 112 MMHG | BODY MASS INDEX: 29.37 KG/M2 | WEIGHT: 172 LBS | TEMPERATURE: 98.4 F | HEIGHT: 64 IN

## 2023-01-18 DIAGNOSIS — R05.1 ACUTE COUGH: Primary | ICD-10-CM

## 2023-01-18 LAB
EXPIRATION DATE: NORMAL
FLUAV AG UPPER RESP QL IA.RAPID: NOT DETECTED
FLUBV AG UPPER RESP QL IA.RAPID: NOT DETECTED
INTERNAL CONTROL: NORMAL
Lab: NORMAL
SARS-COV-2 AG UPPER RESP QL IA.RAPID: NOT DETECTED

## 2023-01-18 PROCEDURE — 99213 OFFICE O/P EST LOW 20 MIN: CPT | Performed by: NURSE PRACTITIONER

## 2023-01-18 PROCEDURE — 87428 SARSCOV & INF VIR A&B AG IA: CPT | Performed by: NURSE PRACTITIONER

## 2023-01-18 RX ORDER — ALBUTEROL SULFATE 90 UG/1
2 AEROSOL, METERED RESPIRATORY (INHALATION) EVERY 4 HOURS PRN
Qty: 18 G | Refills: 0 | Status: SHIPPED | OUTPATIENT
Start: 2023-01-18 | End: 2023-02-06 | Stop reason: SDUPTHER

## 2023-01-18 NOTE — PROGRESS NOTES
Shannan Sharp  1985  [id number]  Patient Care Team:  Pamella Andujar APRN as PCP - General (Internal Medicine)  Sudheer Hamlin MD as Obstetrician (Obstetrics and Gynecology)    Shannan Sharp is a pleasant 38 y.o. female who presents for evaluation of Abnormal Lab (Follow up on elevated WBC ) and Sinus Problem (Runny nose started on Monday night and by yesterday morning a lot of congestion /Drainage, sore throat, cough, and nausea /Negative covid test for 3 days now )    Chief Complaint   Patient presents with   • Abnormal Lab     Follow up on elevated WBC    • Sinus Problem     Runny nose started on Monday night and by yesterday morning a lot of congestion   Drainage, sore throat, cough, and nausea   Negative covid test for 3 days now        HPI:   The patient presents today for evaluation of elevated WBC and mild URI sx.     Shannan had an elevated WBC of 11.78 on routine labs 2 weeks ago.  At that time she had mild URI sx that she assumed were allergies.  Follow up labs showed WBC 14.21 and CXR was unremarkable.  As of Monday she has had onset of more severe URI sx.  Multiple coworkers positive with Covid. She took a COVID-19 test on 01/15/2023, 01/17/2023, and 01/18/2023, which were all negative. . The patient reports that on 01/17/2023, she began to experience drainage, nausea, sore throat, congestion, ear popping, and cough. She has been wheezing and has not been very active the past few days.She denies any ear pain or fever. She has been experiencing congestion, nasal drainage and facial pressure.  She states that she took Sudafed on 01/17/2023. The patient reports that she took NyQuil on 01/16/2023 to help her sleep. She states that she has not taken anything yet today.  She denies any fever. The patient reports that she has received her influenza vaccine.    Past Medical History:   Diagnosis Date   • Essential hypertension 2019   • Mixed hyperlipidemia 2019   • Situational  anxiety 2022     Past Surgical History:   Procedure Laterality Date   • LAPAROSCOPIC CHOLECYSTECTOMY  2009     Family History   Problem Relation Age of Onset   • Hypertension Father    • Diabetes Mother    • Hypertension Mother    • Lung cancer Paternal Grandfather    • Coronary artery disease Maternal Grandmother    • Alzheimer's disease Maternal Grandmother      Social History     Tobacco Use   Smoking Status Never   Smokeless Tobacco Never     Allergies   Allergen Reactions   • Erythromycin        Current Outpatient Medications:   •  ALPRAZolam (XANAX) 0.5 MG tablet, Take 1 tablet by mouth 2 (Two) Times a Day As Needed for Anxiety., Disp: 30 tablet, Rfl: 0  •  busPIRone (BUSPAR) 10 MG tablet, Take 1 tablet by mouth 3 (Three) Times a Day As Needed (anxiety)., Disp: 90 tablet, Rfl: 2  •  doxycycline (VIBRAMYICN) 100 MG tablet, Take 1 tablet by mouth 2 (Two) Times a Day., Disp: 14 tablet, Rfl: 0  •  fluorometholone (FML) 0.1 % ophthalmic suspension, Administer 1 drop to both eyes Daily., Disp: , Rfl:   •  FLUoxetine (PROzac) 20 MG capsule, Take 1 capsule by mouth Daily., Disp: 90 capsule, Rfl: 3  •  hydroCHLOROthiazide (HYDRODIURIL) 25 MG tablet, Take 1 tablet by mouth Daily., Disp: 90 tablet, Rfl: 3  •  lisinopril (PRINIVIL,ZESTRIL) 20 MG tablet, Take 1 tablet by mouth Daily., Disp: 90 tablet, Rfl: 1  •  Multiple Vitamin (DAILY VITAMIN) tablet, Take 1 tablet by mouth daily., Disp: , Rfl:   •  Tri-Sprintec 0.18/0.215/0.25 MG-35 MCG per tablet, Take 1 tablet by mouth Daily., Disp: 84 tablet, Rfl: 4  •  albuterol sulfate  (90 Base) MCG/ACT inhaler, Inhale 2 puffs Every 4 (Four) Hours As Needed for Wheezing or Shortness of Air., Disp: 18 g, Rfl: 0  •  promethazine-dextromethorphan (PROMETHAZINE-DM) 6.25-15 MG/5ML syrup, Take 5 mL by mouth 4 (Four) Times a Day As Needed for Cough., Disp: 240 mL, Rfl: 0    Review of Systems  Review of systems was completed, and pertinent findings are noted in the HPI.  /76  "(BP Location: Left arm, Patient Position: Sitting, Cuff Size: Adult)   Pulse 80   Temp 98.4 °F (36.9 °C) (Temporal)   Ht 162.6 cm (64.02\")   Wt 78 kg (172 lb)   BMI 29.51 kg/m²     Physical Exam  Constitutional:       Appearance: She is well-developed.   HENT:      Head: Normocephalic and atraumatic.      Comments: *wearing mask  Eyes:      Conjunctiva/sclera: Conjunctivae normal.      Pupils: Pupils are equal, round, and reactive to light.   Cardiovascular:      Rate and Rhythm: Normal rate and regular rhythm.      Pulses: Normal pulses.      Heart sounds: Normal heart sounds.   Pulmonary:      Effort: Pulmonary effort is normal.      Breath sounds: Normal breath sounds.   Musculoskeletal:         General: Normal range of motion.      Cervical back: Normal range of motion and neck supple.   Skin:     General: Skin is warm and dry.   Neurological:      Mental Status: She is alert and oriented to person, place, and time.   Psychiatric:         Mood and Affect: Mood normal.         Behavior: Behavior normal.         Thought Content: Thought content normal.         Judgment: Judgment normal.         Procedures    PHQ-9 Total Score:      Assessment/Plan:  Diagnoses and all orders for this visit:    1. Acute cough (Primary)  -     POCT SARS-CoV-2 Antigen LESLEY  -     albuterol sulfate  (90 Base) MCG/ACT inhaler; Inhale 2 puffs Every 4 (Four) Hours As Needed for Wheezing or Shortness of Air.  Dispense: 18 g; Refill: 0  -     CBC & Differential; Future       There are no Patient Instructions on file for this visit.     1.Cough  Comments:  I will prescribe an inhaler to use every 4 hours or as needed. If her symptoms are getting worse despite this, and the cough is persistent, we may prescribe a steroid inhaler. We will repeat laboratory work once she is feeling better. She will start Mucinex over-the-counter and continue Nyquil as needed.      Plan of care reviewed with patient at the conclusion of today's visit. " Education was provided regarding diagnosis, management and any prescribed or recommended OTC medications.  Patient verbalizes understanding of and agreement with management plan.    Return if symptoms worsen or fail to improve.    Dictated Utilizing Dragon Dictation.    Transcribed from ambient dictation for HUMZA Diaz by Bran Villanueva.  01/18/23   14:33 EST    Patient or patient representative verbalized consent to the visit recording.  I have personally performed the services described in this document as transcribed by the above individual, and it is both accurate and complete.

## 2023-01-20 RX ORDER — DEXTROMETHORPHAN HYDROBROMIDE AND PROMETHAZINE HYDROCHLORIDE 15; 6.25 MG/5ML; MG/5ML
5 SYRUP ORAL 4 TIMES DAILY PRN
Qty: 240 ML | Refills: 0 | Status: SHIPPED | OUTPATIENT
Start: 2023-01-20 | End: 2023-03-17 | Stop reason: SDUPTHER

## 2023-01-25 ENCOUNTER — TELEPHONE (OUTPATIENT)
Dept: INTERNAL MEDICINE | Facility: CLINIC | Age: 38
End: 2023-01-25

## 2023-01-25 NOTE — TELEPHONE ENCOUNTER
Make sure she is using her albuterol Q 4 hours.  She may need to come back in to let someone listen to her lungs.  She may need a steroid dosepack.

## 2023-01-25 NOTE — TELEPHONE ENCOUNTER
Caller: Shannan Sharp    Relationship: Self    Best call back number: [phone]    What is the best time to reach you: ANYTIME    Who are you requesting to speak with (clinical staff, provider,  specific staff member): CLINICAL STAFF    Do you know the name of the person who called: PATIENT    What was the call regarding: EXPERIENCING NEW SYMPTOMS OF WHEEZING AND RASH ON CHEST    [facility] [id number]  AT [facility] & [address]  - [phone]  - [phone] FX        Do you require a callback: YES

## 2023-01-25 NOTE — TELEPHONE ENCOUNTER
Called pt; she states she has wheezing, sore throat, rash that started in the past 48 hours. Hydrocortisone cream is not working on the rash, and she asked if she should be taking anything else to assist her symptoms. She stated she can breathe okay, however she gets tired when walking for a little while and wheezes while sitting.    Her cough is productive but her nose isn't running she stated.    She asked if there's anything else she should be taking?    There's also a THEMA message with this issue already sent to you.

## 2023-02-06 DIAGNOSIS — R05.1 ACUTE COUGH: ICD-10-CM

## 2023-02-06 RX ORDER — ALBUTEROL SULFATE 90 UG/1
2 AEROSOL, METERED RESPIRATORY (INHALATION) EVERY 4 HOURS PRN
Qty: 18 G | Refills: 0 | Status: SHIPPED | OUTPATIENT
Start: 2023-02-06 | End: 2023-03-03 | Stop reason: SDUPTHER

## 2023-02-10 DIAGNOSIS — I10 ESSENTIAL HYPERTENSION: ICD-10-CM

## 2023-02-13 RX ORDER — LISINOPRIL 20 MG/1
20 TABLET ORAL DAILY
Qty: 90 TABLET | Refills: 3 | Status: SHIPPED | OUTPATIENT
Start: 2023-02-13

## 2023-03-03 DIAGNOSIS — R05.1 ACUTE COUGH: ICD-10-CM

## 2023-03-03 DIAGNOSIS — F41.1 GAD (GENERALIZED ANXIETY DISORDER): ICD-10-CM

## 2023-03-03 RX ORDER — BUSPIRONE HYDROCHLORIDE 10 MG/1
TABLET ORAL
Qty: 270 TABLET | Refills: 0 | Status: SHIPPED | OUTPATIENT
Start: 2023-03-03

## 2023-03-03 RX ORDER — ALBUTEROL SULFATE 90 UG/1
2 AEROSOL, METERED RESPIRATORY (INHALATION) EVERY 4 HOURS PRN
Qty: 18 G | Refills: 0 | Status: SHIPPED | OUTPATIENT
Start: 2023-03-03

## 2023-03-03 NOTE — TELEPHONE ENCOUNTER
Rx Refill Note  Requested Prescriptions     Pending Prescriptions Disp Refills   • busPIRone (BUSPAR) 10 MG tablet [Pharmacy Med Name: BUSPIRONE  10MG  TAB] 270 tablet      Sig: TAKE 1 TABLET BY MOUTH 3  TIMES DAILY AS NEEDED FOR  ANXIETY      Last office visit with prescribing clinician: 1/18/2023   Last telemedicine visit with prescribing clinician: 7/12/2023   Next office visit with prescribing clinician: 7/12/2023                         Would you like a call back once the refill request has been completed: [] Yes [] No    If the office needs to give you a call back, can they leave a voicemail: [] Yes [] No    Amaris Aldridge LPN  03/03/23, 10:24 EST

## 2023-03-17 RX ORDER — DEXTROMETHORPHAN HYDROBROMIDE AND PROMETHAZINE HYDROCHLORIDE 15; 6.25 MG/5ML; MG/5ML
5 SYRUP ORAL 4 TIMES DAILY PRN
Qty: 240 ML | Refills: 0 | Status: SHIPPED | OUTPATIENT
Start: 2023-03-17

## 2023-03-17 RX ORDER — DOXYCYCLINE HYCLATE 100 MG
100 TABLET ORAL 2 TIMES DAILY
Qty: 14 TABLET | Refills: 0 | Status: SHIPPED | OUTPATIENT
Start: 2023-03-17

## 2023-03-17 NOTE — TELEPHONE ENCOUNTER
Called and spoke with patient. Symptoms started on Monday with diarrhea, deep hollow cough, and sore throat. Symptoms have gotten worse throughout the week. Throat is red, swollen, and painful. She has been taking otc day and night cough syrup with no relief. Please advise.

## 2023-04-09 DIAGNOSIS — R05.1 ACUTE COUGH: ICD-10-CM

## 2023-04-10 RX ORDER — ALBUTEROL SULFATE 90 UG/1
2 AEROSOL, METERED RESPIRATORY (INHALATION) EVERY 4 HOURS PRN
Qty: 18 G | Refills: 0 | Status: SHIPPED | OUTPATIENT
Start: 2023-04-10

## 2023-05-22 DIAGNOSIS — R05.1 ACUTE COUGH: ICD-10-CM

## 2023-05-22 RX ORDER — ALBUTEROL SULFATE 90 UG/1
2 AEROSOL, METERED RESPIRATORY (INHALATION) EVERY 4 HOURS PRN
Qty: 18 G | Refills: 0 | Status: SHIPPED | OUTPATIENT
Start: 2023-05-22

## 2023-06-14 ENCOUNTER — HOSPITAL ENCOUNTER (OUTPATIENT)
Facility: HOSPITAL | Age: 38
Setting detail: OBSERVATION
Discharge: HOME | End: 2023-06-15
Attending: STUDENT IN AN ORGANIZED HEALTH CARE EDUCATION/TRAINING PROGRAM | Admitting: INTERNAL MEDICINE
Payer: COMMERCIAL

## 2023-06-14 ENCOUNTER — APPOINTMENT (OUTPATIENT)
Dept: GENERAL RADIOLOGY | Facility: HOSPITAL | Age: 38
End: 2023-06-14
Payer: COMMERCIAL

## 2023-06-14 ENCOUNTER — APPOINTMENT (OUTPATIENT)
Dept: CT IMAGING | Facility: HOSPITAL | Age: 38
End: 2023-06-14
Payer: COMMERCIAL

## 2023-06-14 ENCOUNTER — APPOINTMENT (OUTPATIENT)
Dept: ULTRASOUND IMAGING | Facility: HOSPITAL | Age: 38
End: 2023-06-14
Payer: COMMERCIAL

## 2023-06-14 DIAGNOSIS — R10.9 ABDOMINAL PAIN, UNSPECIFIED ABDOMINAL LOCATION: ICD-10-CM

## 2023-06-14 DIAGNOSIS — E87.20 LACTIC ACIDEMIA: ICD-10-CM

## 2023-06-14 DIAGNOSIS — R11.2 NAUSEA AND VOMITING, UNSPECIFIED VOMITING TYPE: ICD-10-CM

## 2023-06-14 DIAGNOSIS — R65.10 SIRS (SYSTEMIC INFLAMMATORY RESPONSE SYNDROME): Primary | ICD-10-CM

## 2023-06-14 DIAGNOSIS — D72.829 LEUKOCYTOSIS, UNSPECIFIED TYPE: ICD-10-CM

## 2023-06-14 LAB
ADV 40+41 DNA STL QL NAA+NON-PROBE: NOT DETECTED
ALBUMIN SERPL-MCNC: 4.6 G/DL (ref 3.5–5.2)
ALBUMIN/GLOB SERPL: 1.3 G/DL
ALP SERPL-CCNC: 51 U/L (ref 39–117)
ALT SERPL W P-5'-P-CCNC: 24 U/L (ref 1–33)
ANION GAP SERPL CALCULATED.3IONS-SCNC: 16 MMOL/L (ref 5–15)
AST SERPL-CCNC: 22 U/L (ref 1–32)
ASTRO TYP 1-8 RNA STL QL NAA+NON-PROBE: NOT DETECTED
B PARAPERT DNA SPEC QL NAA+PROBE: NOT DETECTED
B PERT DNA SPEC QL NAA+PROBE: NOT DETECTED
B-HCG UR QL: NEGATIVE
BACTERIA UR QL AUTO: ABNORMAL /HPF
BACTERIA UR QL AUTO: ABNORMAL /HPF
BASOPHILS # BLD AUTO: 0.06 10*3/MM3 (ref 0–0.2)
BASOPHILS # BLD AUTO: 0.08 10*3/MM3 (ref 0–0.2)
BASOPHILS NFR BLD AUTO: 0.3 % (ref 0–1.5)
BASOPHILS NFR BLD AUTO: 0.3 % (ref 0–1.5)
BILIRUB SERPL-MCNC: 0.4 MG/DL (ref 0–1.2)
BILIRUB UR QL STRIP: NEGATIVE
BILIRUB UR QL STRIP: NEGATIVE
BUN SERPL-MCNC: 14 MG/DL (ref 6–20)
BUN/CREAT SERPL: 17.9 (ref 7–25)
C CAYETANENSIS DNA STL QL NAA+NON-PROBE: NOT DETECTED
C COLI+JEJ+UPSA DNA STL QL NAA+NON-PROBE: NOT DETECTED
C PNEUM DNA NPH QL NAA+NON-PROBE: NOT DETECTED
CALCIUM SPEC-SCNC: 10.3 MG/DL (ref 8.6–10.5)
CHLORIDE SERPL-SCNC: 100 MMOL/L (ref 98–107)
CLARITY UR: ABNORMAL
CLARITY UR: ABNORMAL
CO2 SERPL-SCNC: 26 MMOL/L (ref 22–29)
COLOR UR: ABNORMAL
COLOR UR: YELLOW
CREAT SERPL-MCNC: 0.78 MG/DL (ref 0.57–1)
CRYPTOSP DNA STL QL NAA+NON-PROBE: NOT DETECTED
D-LACTATE SERPL-SCNC: 2.9 MMOL/L (ref 0.5–2)
D-LACTATE SERPL-SCNC: 3.6 MMOL/L (ref 0.5–2)
D-LACTATE SERPL-SCNC: 3.7 MMOL/L (ref 0.5–2)
DEPRECATED RDW RBC AUTO: 41.2 FL (ref 37–54)
DEPRECATED RDW RBC AUTO: 41.8 FL (ref 37–54)
E HISTOLYT DNA STL QL NAA+NON-PROBE: NOT DETECTED
EAEC PAA PLAS AGGR+AATA ST NAA+NON-PRB: NOT DETECTED
EC STX1+STX2 GENES STL QL NAA+NON-PROBE: NOT DETECTED
EGFRCR SERPLBLD CKD-EPI 2021: 100.5 ML/MIN/1.73
EOSINOPHIL # BLD AUTO: 0.07 10*3/MM3 (ref 0–0.4)
EOSINOPHIL # BLD AUTO: 0.18 10*3/MM3 (ref 0–0.4)
EOSINOPHIL NFR BLD AUTO: 0.4 % (ref 0.3–6.2)
EOSINOPHIL NFR BLD AUTO: 0.7 % (ref 0.3–6.2)
EPEC EAE GENE STL QL NAA+NON-PROBE: NOT DETECTED
ERYTHROCYTE [DISTWIDTH] IN BLOOD BY AUTOMATED COUNT: 12.6 % (ref 12.3–15.4)
ERYTHROCYTE [DISTWIDTH] IN BLOOD BY AUTOMATED COUNT: 12.9 % (ref 12.3–15.4)
ETEC LTA+ST1A+ST1B TOX ST NAA+NON-PROBE: NOT DETECTED
EXPIRATION DATE: NORMAL
FLUAV SUBTYP SPEC NAA+PROBE: NOT DETECTED
FLUBV RNA ISLT QL NAA+PROBE: NOT DETECTED
G LAMBLIA DNA STL QL NAA+NON-PROBE: NOT DETECTED
GLOBULIN UR ELPH-MCNC: 3.6 GM/DL
GLUCOSE SERPL-MCNC: 123 MG/DL (ref 65–99)
GLUCOSE UR STRIP-MCNC: NEGATIVE MG/DL
GLUCOSE UR STRIP-MCNC: NEGATIVE MG/DL
HADV DNA SPEC NAA+PROBE: NOT DETECTED
HCOV 229E RNA SPEC QL NAA+PROBE: NOT DETECTED
HCOV HKU1 RNA SPEC QL NAA+PROBE: NOT DETECTED
HCOV NL63 RNA SPEC QL NAA+PROBE: NOT DETECTED
HCOV OC43 RNA SPEC QL NAA+PROBE: NOT DETECTED
HCT VFR BLD AUTO: 40 % (ref 34–46.6)
HCT VFR BLD AUTO: 49.9 % (ref 34–46.6)
HGB BLD-MCNC: 13.6 G/DL (ref 12–15.9)
HGB BLD-MCNC: 16.8 G/DL (ref 12–15.9)
HGB UR QL STRIP.AUTO: ABNORMAL
HGB UR QL STRIP.AUTO: ABNORMAL
HMPV RNA NPH QL NAA+NON-PROBE: NOT DETECTED
HOLD SPECIMEN: NORMAL
HPIV1 RNA ISLT QL NAA+PROBE: NOT DETECTED
HPIV2 RNA SPEC QL NAA+PROBE: NOT DETECTED
HPIV3 RNA NPH QL NAA+PROBE: NOT DETECTED
HPIV4 P GENE NPH QL NAA+PROBE: NOT DETECTED
HYALINE CASTS UR QL AUTO: ABNORMAL /LPF
HYALINE CASTS UR QL AUTO: ABNORMAL /LPF
IMM GRANULOCYTES # BLD AUTO: 0.1 10*3/MM3 (ref 0–0.05)
IMM GRANULOCYTES # BLD AUTO: 0.1 10*3/MM3 (ref 0–0.05)
IMM GRANULOCYTES NFR BLD AUTO: 0.4 % (ref 0–0.5)
IMM GRANULOCYTES NFR BLD AUTO: 0.5 % (ref 0–0.5)
INTERNAL NEGATIVE CONTROL: NEGATIVE
INTERNAL POSITIVE CONTROL: POSITIVE
KETONES UR QL STRIP: NEGATIVE
KETONES UR QL STRIP: NEGATIVE
LEUKOCYTE ESTERASE UR QL STRIP.AUTO: NEGATIVE
LEUKOCYTE ESTERASE UR QL STRIP.AUTO: NEGATIVE
LIPASE SERPL-CCNC: 22 U/L (ref 13–60)
LYMPHOCYTES # BLD AUTO: 1.86 10*3/MM3 (ref 0.7–3.1)
LYMPHOCYTES # BLD AUTO: 1.98 10*3/MM3 (ref 0.7–3.1)
LYMPHOCYTES NFR BLD AUTO: 7.3 % (ref 19.6–45.3)
LYMPHOCYTES NFR BLD AUTO: 9.5 % (ref 19.6–45.3)
Lab: NORMAL
M PNEUMO IGG SER IA-ACNC: NOT DETECTED
MAGNESIUM SERPL-MCNC: 1.9 MG/DL (ref 1.6–2.6)
MCH RBC QN AUTO: 30 PG (ref 26.6–33)
MCH RBC QN AUTO: 30.4 PG (ref 26.6–33)
MCHC RBC AUTO-ENTMCNC: 33.7 G/DL (ref 31.5–35.7)
MCHC RBC AUTO-ENTMCNC: 34 G/DL (ref 31.5–35.7)
MCV RBC AUTO: 89.1 FL (ref 79–97)
MCV RBC AUTO: 89.5 FL (ref 79–97)
MONOCYTES # BLD AUTO: 0.46 10*3/MM3 (ref 0.1–0.9)
MONOCYTES # BLD AUTO: 1.06 10*3/MM3 (ref 0.1–0.9)
MONOCYTES NFR BLD AUTO: 2.3 % (ref 5–12)
MONOCYTES NFR BLD AUTO: 3.9 % (ref 5–12)
MUCOUS THREADS URNS QL MICRO: ABNORMAL /HPF
NEUTROPHILS NFR BLD AUTO: 17.13 10*3/MM3 (ref 1.7–7)
NEUTROPHILS NFR BLD AUTO: 23.78 10*3/MM3 (ref 1.7–7)
NEUTROPHILS NFR BLD AUTO: 87 % (ref 42.7–76)
NEUTROPHILS NFR BLD AUTO: 87.4 % (ref 42.7–76)
NITRITE UR QL STRIP: NEGATIVE
NITRITE UR QL STRIP: NEGATIVE
NOROVIRUS GI+II RNA STL QL NAA+NON-PROBE: NOT DETECTED
NRBC BLD AUTO-RTO: 0 /100 WBC (ref 0–0.2)
NRBC BLD AUTO-RTO: 0 /100 WBC (ref 0–0.2)
P SHIGELLOIDES DNA STL QL NAA+NON-PROBE: NOT DETECTED
PH UR STRIP.AUTO: 6 [PH] (ref 5–8)
PH UR STRIP.AUTO: <=5 [PH] (ref 5–8)
PHOSPHATE SERPL-MCNC: 2.9 MG/DL (ref 2.5–4.5)
PLATELET # BLD AUTO: 403 10*3/MM3 (ref 140–450)
PLATELET # BLD AUTO: 489 10*3/MM3 (ref 140–450)
PMV BLD AUTO: 8.7 FL (ref 6–12)
PMV BLD AUTO: 8.9 FL (ref 6–12)
POTASSIUM SERPL-SCNC: 3.9 MMOL/L (ref 3.5–5.2)
PROCALCITONIN SERPL-MCNC: 0.06 NG/ML (ref 0–0.25)
PROT SERPL-MCNC: 8.2 G/DL (ref 6–8.5)
PROT UR QL STRIP: ABNORMAL
PROT UR QL STRIP: ABNORMAL
RBC # BLD AUTO: 4.47 10*6/MM3 (ref 3.77–5.28)
RBC # BLD AUTO: 5.6 10*6/MM3 (ref 3.77–5.28)
RBC # UR STRIP: ABNORMAL /HPF
RBC # UR STRIP: ABNORMAL /HPF
REF LAB TEST METHOD: ABNORMAL
REF LAB TEST METHOD: ABNORMAL
RHINOVIRUS RNA SPEC NAA+PROBE: NOT DETECTED
RSV RNA NPH QL NAA+NON-PROBE: NOT DETECTED
RVA RNA STL QL NAA+NON-PROBE: NOT DETECTED
S ENT+BONG DNA STL QL NAA+NON-PROBE: NOT DETECTED
SAPO I+II+IV+V RNA STL QL NAA+NON-PROBE: NOT DETECTED
SARS-COV-2 RNA NPH QL NAA+NON-PROBE: NOT DETECTED
SHIGELLA SP+EIEC IPAH ST NAA+NON-PROBE: NOT DETECTED
SODIUM SERPL-SCNC: 142 MMOL/L (ref 136–145)
SP GR UR STRIP: 1.03 (ref 1–1.03)
SP GR UR STRIP: 1.06 (ref 1–1.03)
SQUAMOUS #/AREA URNS HPF: ABNORMAL /HPF
SQUAMOUS #/AREA URNS HPF: ABNORMAL /HPF
UROBILINOGEN UR QL STRIP: ABNORMAL
UROBILINOGEN UR QL STRIP: ABNORMAL
V CHOL+PARA+VUL DNA STL QL NAA+NON-PROBE: NOT DETECTED
V CHOLERAE DNA STL QL NAA+NON-PROBE: NOT DETECTED
WBC # UR STRIP: ABNORMAL /HPF
WBC # UR STRIP: ABNORMAL /HPF
WBC NRBC COR # BLD: 19.68 10*3/MM3 (ref 3.4–10.8)
WBC NRBC COR # BLD: 27.18 10*3/MM3 (ref 3.4–10.8)
WHOLE BLOOD HOLD COAG: NORMAL
WHOLE BLOOD HOLD SPECIMEN: NORMAL
Y ENTEROCOL DNA STL QL NAA+NON-PROBE: NOT DETECTED

## 2023-06-14 PROCEDURE — 25010000002 PIPERACILLIN SOD-TAZOBACTAM PER 1 G: Performed by: STUDENT IN AN ORGANIZED HEALTH CARE EDUCATION/TRAINING PROGRAM

## 2023-06-14 PROCEDURE — 81001 URINALYSIS AUTO W/SCOPE: CPT | Performed by: STUDENT IN AN ORGANIZED HEALTH CARE EDUCATION/TRAINING PROGRAM

## 2023-06-14 PROCEDURE — 76830 TRANSVAGINAL US NON-OB: CPT

## 2023-06-14 PROCEDURE — 96372 THER/PROPH/DIAG INJ SC/IM: CPT

## 2023-06-14 PROCEDURE — 80053 COMPREHEN METABOLIC PANEL: CPT | Performed by: STUDENT IN AN ORGANIZED HEALTH CARE EDUCATION/TRAINING PROGRAM

## 2023-06-14 PROCEDURE — 81025 URINE PREGNANCY TEST: CPT | Performed by: STUDENT IN AN ORGANIZED HEALTH CARE EDUCATION/TRAINING PROGRAM

## 2023-06-14 PROCEDURE — 25010000002 ONDANSETRON PER 1 MG: Performed by: STUDENT IN AN ORGANIZED HEALTH CARE EDUCATION/TRAINING PROGRAM

## 2023-06-14 PROCEDURE — 84145 PROCALCITONIN (PCT): CPT | Performed by: INTERNAL MEDICINE

## 2023-06-14 PROCEDURE — 25510000001 IOPAMIDOL 61 % SOLUTION: Performed by: STUDENT IN AN ORGANIZED HEALTH CARE EDUCATION/TRAINING PROGRAM

## 2023-06-14 PROCEDURE — 85025 COMPLETE CBC W/AUTO DIFF WBC: CPT | Performed by: STUDENT IN AN ORGANIZED HEALTH CARE EDUCATION/TRAINING PROGRAM

## 2023-06-14 PROCEDURE — 87507 IADNA-DNA/RNA PROBE TQ 12-25: CPT | Performed by: INTERNAL MEDICINE

## 2023-06-14 PROCEDURE — 93976 VASCULAR STUDY: CPT

## 2023-06-14 PROCEDURE — 96376 TX/PRO/DX INJ SAME DRUG ADON: CPT

## 2023-06-14 PROCEDURE — 36415 COLL VENOUS BLD VENIPUNCTURE: CPT

## 2023-06-14 PROCEDURE — 71045 X-RAY EXAM CHEST 1 VIEW: CPT

## 2023-06-14 PROCEDURE — 0202U NFCT DS 22 TRGT SARS-COV-2: CPT | Performed by: STUDENT IN AN ORGANIZED HEALTH CARE EDUCATION/TRAINING PROGRAM

## 2023-06-14 PROCEDURE — 96365 THER/PROPH/DIAG IV INF INIT: CPT

## 2023-06-14 PROCEDURE — 25010000002 PIPERACILLIN SOD-TAZOBACTAM PER 1 G: Performed by: INTERNAL MEDICINE

## 2023-06-14 PROCEDURE — 84100 ASSAY OF PHOSPHORUS: CPT | Performed by: STUDENT IN AN ORGANIZED HEALTH CARE EDUCATION/TRAINING PROGRAM

## 2023-06-14 PROCEDURE — 96375 TX/PRO/DX INJ NEW DRUG ADDON: CPT

## 2023-06-14 PROCEDURE — G0378 HOSPITAL OBSERVATION PER HR: HCPCS

## 2023-06-14 PROCEDURE — 87086 URINE CULTURE/COLONY COUNT: CPT | Performed by: INTERNAL MEDICINE

## 2023-06-14 PROCEDURE — 25010000002 KETOROLAC TROMETHAMINE PER 15 MG: Performed by: STUDENT IN AN ORGANIZED HEALTH CARE EDUCATION/TRAINING PROGRAM

## 2023-06-14 PROCEDURE — 25010000002 DICYCLOMINE PER 20 MG: Performed by: STUDENT IN AN ORGANIZED HEALTH CARE EDUCATION/TRAINING PROGRAM

## 2023-06-14 PROCEDURE — 74177 CT ABD & PELVIS W/CONTRAST: CPT

## 2023-06-14 PROCEDURE — 83735 ASSAY OF MAGNESIUM: CPT | Performed by: STUDENT IN AN ORGANIZED HEALTH CARE EDUCATION/TRAINING PROGRAM

## 2023-06-14 PROCEDURE — 83690 ASSAY OF LIPASE: CPT | Performed by: STUDENT IN AN ORGANIZED HEALTH CARE EDUCATION/TRAINING PROGRAM

## 2023-06-14 PROCEDURE — 99285 EMERGENCY DEPT VISIT HI MDM: CPT

## 2023-06-14 PROCEDURE — 83605 ASSAY OF LACTIC ACID: CPT | Performed by: STUDENT IN AN ORGANIZED HEALTH CARE EDUCATION/TRAINING PROGRAM

## 2023-06-14 PROCEDURE — 96361 HYDRATE IV INFUSION ADD-ON: CPT

## 2023-06-14 PROCEDURE — 87040 BLOOD CULTURE FOR BACTERIA: CPT | Performed by: STUDENT IN AN ORGANIZED HEALTH CARE EDUCATION/TRAINING PROGRAM

## 2023-06-14 RX ORDER — SODIUM CHLORIDE 9 MG/ML
40 INJECTION, SOLUTION INTRAVENOUS AS NEEDED
Status: DISCONTINUED | OUTPATIENT
Start: 2023-06-14 | End: 2023-06-15 | Stop reason: HOSPADM

## 2023-06-14 RX ORDER — SODIUM CHLORIDE 9 MG/ML
10 INJECTION INTRAVENOUS AS NEEDED
Status: DISCONTINUED | OUTPATIENT
Start: 2023-06-14 | End: 2023-06-15 | Stop reason: HOSPADM

## 2023-06-14 RX ORDER — SODIUM CHLORIDE, SODIUM LACTATE, POTASSIUM CHLORIDE, CALCIUM CHLORIDE 600; 310; 30; 20 MG/100ML; MG/100ML; MG/100ML; MG/100ML
125 INJECTION, SOLUTION INTRAVENOUS CONTINUOUS
Status: DISCONTINUED | OUTPATIENT
Start: 2023-06-14 | End: 2023-06-15 | Stop reason: HOSPADM

## 2023-06-14 RX ORDER — BUSPIRONE HYDROCHLORIDE 10 MG/1
10 TABLET ORAL EVERY 12 HOURS SCHEDULED
Status: DISCONTINUED | OUTPATIENT
Start: 2023-06-14 | End: 2023-06-15 | Stop reason: HOSPADM

## 2023-06-14 RX ORDER — ONDANSETRON 2 MG/ML
4 INJECTION INTRAMUSCULAR; INTRAVENOUS EVERY 6 HOURS PRN
Status: DISCONTINUED | OUTPATIENT
Start: 2023-06-14 | End: 2023-06-15 | Stop reason: HOSPADM

## 2023-06-14 RX ORDER — AMOXICILLIN 250 MG
2 CAPSULE ORAL 2 TIMES DAILY
Status: DISCONTINUED | OUTPATIENT
Start: 2023-06-14 | End: 2023-06-15 | Stop reason: HOSPADM

## 2023-06-14 RX ORDER — FLUOXETINE HYDROCHLORIDE 20 MG/1
20 CAPSULE ORAL DAILY
Status: DISCONTINUED | OUTPATIENT
Start: 2023-06-14 | End: 2023-06-15 | Stop reason: HOSPADM

## 2023-06-14 RX ORDER — SODIUM CHLORIDE 9 MG/ML
100 INJECTION, SOLUTION INTRAVENOUS CONTINUOUS
Status: DISCONTINUED | OUTPATIENT
Start: 2023-06-14 | End: 2023-06-15 | Stop reason: HOSPADM

## 2023-06-14 RX ORDER — POLYETHYLENE GLYCOL 3350 17 G/17G
17 POWDER, FOR SOLUTION ORAL DAILY PRN
Status: DISCONTINUED | OUTPATIENT
Start: 2023-06-14 | End: 2023-06-15 | Stop reason: HOSPADM

## 2023-06-14 RX ORDER — BISACODYL 5 MG/1
5 TABLET, DELAYED RELEASE ORAL DAILY PRN
Status: DISCONTINUED | OUTPATIENT
Start: 2023-06-14 | End: 2023-06-15 | Stop reason: HOSPADM

## 2023-06-14 RX ORDER — DICYCLOMINE HYDROCHLORIDE 10 MG/ML
20 INJECTION INTRAMUSCULAR ONCE
Status: COMPLETED | OUTPATIENT
Start: 2023-06-14 | End: 2023-06-14

## 2023-06-14 RX ORDER — KETOROLAC TROMETHAMINE 15 MG/ML
15 INJECTION, SOLUTION INTRAMUSCULAR; INTRAVENOUS ONCE
Status: COMPLETED | OUTPATIENT
Start: 2023-06-14 | End: 2023-06-14

## 2023-06-14 RX ORDER — ONDANSETRON 2 MG/ML
4 INJECTION INTRAMUSCULAR; INTRAVENOUS ONCE
Status: COMPLETED | OUTPATIENT
Start: 2023-06-14 | End: 2023-06-14

## 2023-06-14 RX ORDER — ALPRAZOLAM 0.5 MG/1
0.5 TABLET ORAL 2 TIMES DAILY PRN
Status: DISCONTINUED | OUTPATIENT
Start: 2023-06-14 | End: 2023-06-15 | Stop reason: HOSPADM

## 2023-06-14 RX ORDER — SODIUM CHLORIDE 0.9 % (FLUSH) 0.9 %
10 SYRINGE (ML) INJECTION AS NEEDED
Status: DISCONTINUED | OUTPATIENT
Start: 2023-06-14 | End: 2023-06-15 | Stop reason: HOSPADM

## 2023-06-14 RX ORDER — BISACODYL 10 MG
10 SUPPOSITORY, RECTAL RECTAL DAILY PRN
Status: DISCONTINUED | OUTPATIENT
Start: 2023-06-14 | End: 2023-06-15 | Stop reason: HOSPADM

## 2023-06-14 RX ORDER — HYDROCODONE BITARTRATE AND ACETAMINOPHEN 5; 325 MG/1; MG/1
1 TABLET ORAL EVERY 4 HOURS PRN
Status: DISCONTINUED | OUTPATIENT
Start: 2023-06-14 | End: 2023-06-15 | Stop reason: HOSPADM

## 2023-06-14 RX ORDER — SODIUM CHLORIDE 0.9 % (FLUSH) 0.9 %
10 SYRINGE (ML) INJECTION EVERY 12 HOURS SCHEDULED
Status: DISCONTINUED | OUTPATIENT
Start: 2023-06-14 | End: 2023-06-15 | Stop reason: HOSPADM

## 2023-06-14 RX ADMIN — HYDROCODONE BITARTRATE AND ACETAMINOPHEN 1 TABLET: 5; 325 TABLET ORAL at 20:15

## 2023-06-14 RX ADMIN — FLUOXETINE 20 MG: 20 CAPSULE ORAL at 12:12

## 2023-06-14 RX ADMIN — DICYCLOMINE HYDROCHLORIDE 20 MG: 10 INJECTION, SOLUTION INTRAMUSCULAR at 03:42

## 2023-06-14 RX ADMIN — SODIUM CHLORIDE 100 ML/HR: 9 INJECTION, SOLUTION INTRAVENOUS at 12:24

## 2023-06-14 RX ADMIN — SODIUM CHLORIDE 1000 ML: 9 INJECTION, SOLUTION INTRAVENOUS at 11:23

## 2023-06-14 RX ADMIN — ONDANSETRON 4 MG: 2 INJECTION INTRAMUSCULAR; INTRAVENOUS at 03:42

## 2023-06-14 RX ADMIN — SODIUM CHLORIDE, POTASSIUM CHLORIDE, SODIUM LACTATE AND CALCIUM CHLORIDE 125 ML/HR: 600; 310; 30; 20 INJECTION, SOLUTION INTRAVENOUS at 09:41

## 2023-06-14 RX ADMIN — Medication 10 ML: at 20:15

## 2023-06-14 RX ADMIN — SODIUM CHLORIDE, POTASSIUM CHLORIDE, SODIUM LACTATE AND CALCIUM CHLORIDE 2000 ML: 600; 310; 30; 20 INJECTION, SOLUTION INTRAVENOUS at 03:41

## 2023-06-14 RX ADMIN — HYDROCODONE BITARTRATE AND ACETAMINOPHEN 1 TABLET: 5; 325 TABLET ORAL at 11:22

## 2023-06-14 RX ADMIN — ONDANSETRON 4 MG: 2 INJECTION INTRAMUSCULAR; INTRAVENOUS at 06:29

## 2023-06-14 RX ADMIN — TAZOBACTAM SODIUM AND PIPERACILLIN SODIUM 3.38 G: 375; 3 INJECTION, SOLUTION INTRAVENOUS at 09:07

## 2023-06-14 RX ADMIN — TAZOBACTAM SODIUM AND PIPERACILLIN SODIUM 3.38 G: 375; 3 INJECTION, SOLUTION INTRAVENOUS at 16:09

## 2023-06-14 RX ADMIN — KETOROLAC TROMETHAMINE 15 MG: 15 INJECTION, SOLUTION INTRAMUSCULAR; INTRAVENOUS at 03:42

## 2023-06-14 RX ADMIN — IOPAMIDOL 100 ML: 612 INJECTION, SOLUTION INTRAVENOUS at 03:56

## 2023-06-14 RX ADMIN — BUSPIRONE HYDROCHLORIDE 10 MG: 10 TABLET ORAL at 20:15

## 2023-06-14 RX ADMIN — BUSPIRONE HYDROCHLORIDE 10 MG: 10 TABLET ORAL at 12:12

## 2023-06-14 NOTE — CASE MANAGEMENT/SOCIAL WORK
Discharge Planning Assessment  [facility]     Patient Name: Shannan Sharp  MRN: [id number]  Today's Date: 6/14/2023    Admit Date: 6/14/2023    Plan: IDP   Discharge Needs Assessment       Row Name 06/14/23 1323       Living Environment    People in Home alone    Current Living Arrangements home    Potentially Unsafe Housing Conditions unable to assess    Primary Care Provided by self    Provides Primary Care For no one    Family Caregiver if Needed parent(s)    Family Caregiver Names Geoff Sharp    Quality of Family Relationships unable to assess    Able to Return to Prior Arrangements yes       Resource/Environmental Concerns    Transportation Concerns none       Transition Planning    Patient/Family Anticipates Transition to home    Transportation Anticipated family or friend will provide       Discharge Needs Assessment    Readmission Within the Last 30 Days no previous admission in last 30 days    Equipment Currently Used at Home none    Concerns to be Addressed no discharge needs identified    Equipment Needed After Discharge other (see comments)  tbd    Discharge Facility/Level of Care Needs other (see comments)  tbd                   Discharge Plan       Row Name 06/14/23 1324       Plan    Plan IDP    Plan Comments MSW met with pt. at bedside. Pt. lives alone in [place]. Pt.'s PCP is HUMZA Diaz. [facility]s [facility] is [facility]. Pt.'s insurance is [employer]. Pt. is independent at baseline. Pt. denies DME, O2 and HH services. Pt. has transportation back home when she is medically ready to d/c. Pt. doesn't have an advanced directive or ACP documentation on file. CM will continue to follow pt. throughout her stay.    Final Discharge Disposition Code 30 - still a patient                  Continued Care and Services - Admitted Since 6/14/2023    Coordination has not been started for this encounter.       Expected Discharge Date and Time       Expected Discharge Date Expected  Discharge Time    Jun 16, 2023            Demographic Summary       Row Name 06/14/23 1322       General Information    Admission Type observation    Arrived From home    Referral Source admission list;family    Reason for Consult discharge planning    Preferred Language English                   Functional Status       Row Name 06/14/23 1323       Functional Status, IADL    Medications independent    Meal Preparation independent    Housekeeping independent    Laundry independent    Shopping independent       Mental Status Summary    Recent Changes in Mental Status/Cognitive Functioning unable to assess       Employment/    Employment Status employed full-time                   Psychosocial    No documentation.                  Abuse/Neglect    No documentation.                  Legal    No documentation.                  Substance Abuse    No documentation.                  Patient Forms    No documentation.                     HECTOR Mercedes

## 2023-06-14 NOTE — Clinical Note
Level of Care: Telemetry [5]   Diagnosis: SIRS (systemic inflammatory response syndrome) [111185]   Admitting Physician: CHEIKH SPEAR [[id number]]   Attending Physician: CHEIKH SPEAR [[id number]]   Bed Request Comments: TELE

## 2023-06-14 NOTE — H&P
[facility] Medicine Services  HISTORY AND PHYSICAL    Patient Name: Shannan Sharp  : 1985  MRN: [id number]  Primary Care Physician: Pamella Andujar APRN  Date of admission: 2023      Subjective   Subjective     Chief Complaint: Abdominal pain, diarrhea, nausea and vomiting    HPI:  Shannan Sharp is a 38 y.o. female with history of anxiety, hypertension, hyperlipidemia who presents with 1 day history of diffuse abdominal pain, with associated nausea, vomiting and diarrhea.  Pain is mostly lower abdomen, did have some radiation to the right flank.  She also does endorse some chills, denies any recent sick contacts.  On arrival here patient is hypotensive. Initial work-up reveals WBC 27,000, lactate 3.7, UA with 3+ bacteria, CT abd/pelvis was unremarkable, transvaginal ultrasound was negative for torsion, respiratory PCR with COVID was negative, blood and urine cultures were collected, patient did receive IV fluids and was started on IV Zosyn.  Hospital medicine was then asked to admit.  At the time of my evaluation she continues to have some abdominal cramping, nausea and vomiting are much improved.    Review of Systems   Gen- No fevers, chills  CV- No chest pain, palpitations  Resp- No cough, dyspnea  GI- + N/V/D, +abd pain      Personal History     Past Medical History:   Diagnosis Date   • Essential hypertension    • Mixed hyperlipidemia    • Situational anxiety        Past Surgical History:   Procedure Laterality Date   • LAPAROSCOPIC CHOLECYSTECTOMY         Family History: family history includes Alzheimer's disease in her maternal grandmother; Coronary artery disease in her maternal grandmother; Diabetes in her mother; Hypertension in her father and mother; Lung cancer in her paternal grandfather.     Social History:  reports that she has never smoked. She has never used smokeless tobacco. She reports current alcohol use of about 4.0  standard drinks per week. She reports that she does not use drugs.  Social History     Social History Narrative   • Not on file       Medications:  Available home medication information reviewed.  (Not in a hospital admission)      Allergies   Allergen Reactions   • Erythromycin        Objective   Objective     Vital Signs:   Temp:  [98.5 °F (36.9 °C)] 98.5 °F (36.9 °C)  Heart Rate:  [70-86] 77  Resp:  [16-18] 16  BP: ()/(55-98) 114/87       Physical Exam   Constitutional: Awake, alert  Eyes: PERRLA, sclerae anicteric, no conjunctival injection  HENT: NCAT, mucous membranes moist  Neck: Supple, no thyromegaly, no lymphadenopathy, trachea midline  Respiratory: Clear to auscultation bilaterally, nonlabored respirations   Cardiovascular: RRR, no murmurs, rubs, or gallops, palpable pedal pulses bilaterally  Gastrointestinal: Positive bowel sounds, soft, nontender, nondistended  Musculoskeletal: No bilateral ankle edema, no clubbing or cyanosis to extremities  Psychiatric: Appropriate affect, cooperative  Neurologic: Oriented x 3, strength symmetric in all extremities, Cranial Nerves grossly intact to confrontation, speech clear  Skin: No rashes    Result Review:  I have personally reviewed the results from the time of this admission to 6/14/2023 10:17 EDT and agree with these findings:  [x]  Laboratory list / accordion  []  Microbiology  []  Radiology  []  EKG/Telemetry   [x]  Cardiology/Vascular   []  Pathology  []  Old records  []  Other:  Most notable findings include:       LAB RESULTS:      Lab 06/14/23  0627 06/14/23  0340   WBC 19.68* 27.18*   HEMOGLOBIN 13.6 16.8*   HEMATOCRIT 40.0 49.9*   PLATELETS 403 489*   NEUTROS ABS 17.13* 23.78*   IMMATURE GRANS (ABS) 0.10* 0.10*   LYMPHS ABS 1.86 1.98   MONOS ABS 0.46 1.06*   EOS ABS 0.07 0.18   MCV 89.5 89.1   PROCALCITONIN  --  0.06   LACTATE 3.7* 3.6*         Lab 06/14/23  0340   SODIUM 142   POTASSIUM 3.9   CHLORIDE 100   CO2 26.0   ANION GAP 16.0*   BUN 14    CREATININE 0.78   EGFR 100.5   GLUCOSE 123*   CALCIUM 10.3   MAGNESIUM 1.9   PHOSPHORUS 2.9         Lab 06/14/23  0340   TOTAL PROTEIN 8.2   ALBUMIN 4.6   GLOBULIN 3.6   ALT (SGPT) 24   AST (SGOT) 22   BILIRUBIN 0.4   ALK PHOS 51   LIPASE 22                     UA        1/11/2023    14:58 6/14/2023    03:22 6/14/2023    08:44   Urinalysis   Squamous Epithelial Cells, UA  7-12  7-12    Specific Gravity, UA 1.014  1.029  1.063    Ketones, UA Negative  Negative  Negative    Blood, UA Negative  Trace  Moderate (2+)    Leukocytes, UA Negative  Negative  Negative    Nitrite, UA Negative  Negative  Negative    RBC, UA  0-2  3-6    WBC, UA  3-5  0-2    Bacteria, UA  1+  3+        Microbiology Results (last 10 days)     Procedure Component Value - Date/Time    COVID PRE-OP / PRE-PROCEDURE SCREENING ORDER (NO ISOLATION) - Swab, Nasopharynx [[id number]]  (Normal) Collected: 06/14/23 0808    Lab Status: Final result Specimen: Swab from Nasopharynx Updated: 06/14/23 0924    Narrative:      The following orders were created for panel order COVID PRE-OP / PRE-PROCEDURE SCREENING ORDER (NO ISOLATION) - Swab, Nasopharynx.  Procedure                               Abnormality         Status                     ---------                               -----------         ------                     Respiratory Panel PCR w/...[[id number]]  Normal              Final result                 Please view results for these tests on the individual orders.    Respiratory Panel PCR w/COVID-19(SARS-CoV-2) [facility]VERN/[facility]/PAD/[facility]/MAD/ERMA In-House, NP Swab in UTM/VTM, 3-4 HR TAT - Swab, Nasopharynx [[id number]]  (Normal) Collected: 06/14/23 0808    Lab Status: Final result Specimen: Swab from Nasopharynx Updated: 06/14/23 0924     ADENOVIRUS, PCR Not Detected     Coronavirus 229E Not Detected     Coronavirus HKU1 Not Detected     Coronavirus NL63 Not Detected     Coronavirus OC43 Not Detected     COVID19 Not Detected     Human Metapneumovirus Not  Detected     Human Rhinovirus/Enterovirus Not Detected     Influenza A PCR Not Detected     Influenza B PCR Not Detected     Parainfluenza Virus 1 Not Detected     Parainfluenza Virus 2 Not Detected     Parainfluenza Virus 3 Not Detected     Parainfluenza Virus 4 Not Detected     RSV, PCR Not Detected     Bordetella pertussis pcr Not Detected     Bordetella parapertussis PCR Not Detected     Chlamydophila pneumoniae PCR Not Detected     Mycoplasma pneumo by PCR Not Detected    Narrative:      In the setting of a positive respiratory panel with a viral infection PLUS a negative procalcitonin without other underlying concern for bacterial infection, consider observing off antibiotics or discontinuation of antibiotics and continue supportive care. If the respiratory panel is positive for atypical bacterial infection (Bordetella pertussis, Chlamydophila pneumoniae, or Mycoplasma pneumoniae), consider antibiotic de-escalation to target atypical bacterial infection.          US Non-ob Transvaginal    Result Date: 6/14/2023  US NON-OB TRANSVAGINAL, US TESTICULAR OR OVARIAN VASCULAR LIMITED Date of Exam: 6/14/2023 8:21 AM EDT Indication: Intermittent severe right lower quadrant pain, rising lactate, leukocytosis. Comparison: No comparisons available. Technique: Transvaginal pelvic ultrasound was performed.  Grayscale and color Doppler imaging was utilized to evaluate the pelvic area with image documentation per protocol. Findings: The uterus measures 7.1 x 3.0 x 4.2 cm and contains a 1.8 cm hypoechoic mass along the posterior uterine body, likely a fibroid. The endometrial stripe thickness measures 1.4 mm. Both ovaries appear normal with normal appearing flow. The right ovary measures 1.9 x 1.4 x 1.7 cm, while the left ovary measures 3.2 x 2.0 x 2.6 cm.     Impression: Impression: 1.1.8 cm mass along posterior uterine body, likely a fibroid. 2.Both adnexa appear within normal limits. Electronically Signed: Isaias Irene   6/14/2023 8:58 AM EDT  Workstation ID: [id number]    CT Abdomen Pelvis With Contrast    Result Date: 6/14/2023  EXAMINATION: CT ABDOMEN AND PELVIS WITH IV CONTRAST   DATE OF EXAMINATION: 6/14/2023. COMPARISON: None available. INDICATION: Nausea and vomiting with right lower quadrant pain. PROCEDURE:  Axial CT of the abdomen and pelvis was performed with contrast and sagittal and coronal reformatted images were performed.  85 mL of Isovue-300 was given intravenously. CT dose lowering techniques were used, to include: automated exposure control, adjustment for patient size, and/or use of iterative reconstruction. FINDINGS: LOWER CHEST :  The visualized lung bases are clear.  There are no pleural or pericardial effusions. ABDOMEN: Liver and Biliary system:  Normal. Adrenal glands:  Normal. Kidneys and ureters: Normal. Spleen:  Normal. Pancreas:  Normal. Gallbladder:  Surgically absent. Lymph nodes, Peritoneum and mesentery:  There is no mesenteric or retroperitoneal lymphadenopathy. Gastrointestinal tract:  There are no dilated loops of bowel or free intraperitoneal air.    The appendix is normal. Aorta/IVC:   No aortic aneurysm.  IVC normal. Abdominal wall:  Normal. PELVIS: Fluid: There is no free fluid in the pelvis. Lymph Nodes:  There is no pelvic or inguinal lymphadenopathy.. Urinary bladder:  Normal. BONES:  There are no osseous destructive lesions.. ADDITIONAL  SIGNIFICANT FINDINGS:  None.     Impression: No acute process within the abdomen or pelvis. Electronically signed by:  Raphael Horn D.O.  6/14/2023 2:47 AM Mountain Time    XR Chest 1 View    Result Date: 6/14/2023  XR CHEST 1 VW Date of Exam: 6/14/2023 8:17 AM EDT Indication: sepsis, infectious workup. Comparison: 1/12/2023 Findings: The heart size is normal and the lungs appear clear     Impression: Impression: No active disease Electronically Signed: Chi Cintron  6/14/2023 9:27 AM EDT  Workstation ID: [id number]     Testicular or Ovarian Vascular  Limited    Result Date: 6/14/2023  US NON-OB TRANSVAGINAL, US TESTICULAR OR OVARIAN VASCULAR LIMITED Date of Exam: 6/14/2023 8:21 AM EDT Indication: Intermittent severe right lower quadrant pain, rising lactate, leukocytosis. Comparison: No comparisons available. Technique: Transvaginal pelvic ultrasound was performed.  Grayscale and color Doppler imaging was utilized to evaluate the pelvic area with image documentation per protocol. Findings: The uterus measures 7.1 x 3.0 x 4.2 cm and contains a 1.8 cm hypoechoic mass along the posterior uterine body, likely a fibroid. The endometrial stripe thickness measures 1.4 mm. Both ovaries appear normal with normal appearing flow. The right ovary measures 1.9 x 1.4 x 1.7 cm, while the left ovary measures 3.2 x 2.0 x 2.6 cm.     Impression: Impression: 1.1.8 cm mass along posterior uterine body, likely a fibroid. 2.Both adnexa appear within normal limits. Electronically Signed: Isaias Irene  6/14/2023 8:58 AM EDT  Workstation ID: [id number]      Assessment & Plan   Assessment & Plan     Active Hospital Problems    Diagnosis  POA   • **SIRS (systemic inflammatory response syndrome) [R65.10]  Yes   • Leukocytosis [D72.829]  Yes   • Lactic acidosis [E87.20]  Yes   • Situational anxiety [F41.8]  Yes   • Essential hypertension [I10]  Yes     38-year-old female with history of hypertension, hyperlipidemia and anxiety who presents to the ED with one day history of diffuse abdominal pain, diarrhea, nausea and vomiting    SIRS  -Patient presenting with diffuse abdominal pain, significant leukocytosis, lactic acidosis, source of infection is entirely not clear at this time, UTI/pyelonephritis is a consideration vs viral gastroenteritis  -UA with 3+ bacteria but also with some squames  -CT abdomen/pelvis is unremarkable, transvaginal ultrasound is negative  -Follow-up blood and urine cultures, GI PCR, continue empiric antibiotics with Zosyn  -Continue IV fluids, pain control,  antiemetics  -Follow-up procalcitonin    Hypertension  -BP is currently low, will hold home lisinopril and HCTZ  -Continue IV fluids as above    Anxiety and depression  -Continue BuSpar, Xanax, Flomax    Hyperlipidemia    DVT prophylaxis: Mechanical    CODE STATUS:   Code Status and Medical Interventions:   Ordered at: 06/14/23 1017     Level Of Support Discussed With:    Patient     Code Status (Patient has no pulse and is not breathing):    CPR (Attempt to Resuscitate)     Medical Interventions (Patient has pulse or is breathing):    Full Support     Expected Discharge   Expected Discharge Date: 6/16/2023; Expected Discharge Time:      Oneil Pena MD  06/14/23

## 2023-06-15 ENCOUNTER — READMISSION MANAGEMENT (OUTPATIENT)
Dept: CALL CENTER | Facility: HOSPITAL | Age: 38
End: 2023-06-15
Payer: COMMERCIAL

## 2023-06-15 VITALS
HEIGHT: 64 IN | SYSTOLIC BLOOD PRESSURE: 124 MMHG | WEIGHT: 169.75 LBS | BODY MASS INDEX: 28.98 KG/M2 | OXYGEN SATURATION: 95 % | DIASTOLIC BLOOD PRESSURE: 95 MMHG | HEART RATE: 80 BPM | RESPIRATION RATE: 16 BRPM | TEMPERATURE: 98.2 F

## 2023-06-15 LAB
ALBUMIN SERPL-MCNC: 3.3 G/DL (ref 3.5–5.2)
ALBUMIN/GLOB SERPL: 1.4 G/DL
ALP SERPL-CCNC: 27 U/L (ref 39–117)
ALT SERPL W P-5'-P-CCNC: 20 U/L (ref 1–33)
ANION GAP SERPL CALCULATED.3IONS-SCNC: 6 MMOL/L (ref 5–15)
AST SERPL-CCNC: 20 U/L (ref 1–32)
BASOPHILS # BLD MANUAL: 0 10*3/MM3 (ref 0–0.2)
BASOPHILS NFR BLD MANUAL: 0 % (ref 0–1.5)
BILIRUB SERPL-MCNC: 0.4 MG/DL (ref 0–1.2)
BUN SERPL-MCNC: 6 MG/DL (ref 6–20)
BUN/CREAT SERPL: 9 (ref 7–25)
CALCIUM SPEC-SCNC: 8.2 MG/DL (ref 8.6–10.5)
CHLORIDE SERPL-SCNC: 110 MMOL/L (ref 98–107)
CO2 SERPL-SCNC: 25 MMOL/L (ref 22–29)
CREAT SERPL-MCNC: 0.67 MG/DL (ref 0.57–1)
CYTOLOGIST CVX/VAG CYTO: NORMAL
DEPRECATED RDW RBC AUTO: 43.5 FL (ref 37–54)
EGFRCR SERPLBLD CKD-EPI 2021: 115.6 ML/MIN/1.73
EOSINOPHIL # BLD MANUAL: 0.52 10*3/MM3 (ref 0–0.4)
EOSINOPHIL NFR BLD MANUAL: 5 % (ref 0.3–6.2)
ERYTHROCYTE [DISTWIDTH] IN BLOOD BY AUTOMATED COUNT: 12.8 % (ref 12.3–15.4)
GLOBULIN UR ELPH-MCNC: 2.3 GM/DL
GLUCOSE SERPL-MCNC: 96 MG/DL (ref 65–99)
HCT VFR BLD AUTO: 38.9 % (ref 34–46.6)
HGB BLD-MCNC: 12.8 G/DL (ref 12–15.9)
LYMPHOCYTES # BLD MANUAL: 5.63 10*3/MM3 (ref 0.7–3.1)
LYMPHOCYTES NFR BLD MANUAL: 2 % (ref 5–12)
MCH RBC QN AUTO: 30.4 PG (ref 26.6–33)
MCHC RBC AUTO-ENTMCNC: 32.9 G/DL (ref 31.5–35.7)
MCV RBC AUTO: 92.4 FL (ref 79–97)
MONOCYTES # BLD: 0.21 10*3/MM3 (ref 0.1–0.9)
NEUTROPHILS # BLD AUTO: 4.07 10*3/MM3 (ref 1.7–7)
NEUTROPHILS NFR BLD MANUAL: 39 % (ref 42.7–76)
NRBC SPEC MANUAL: 0 /100 WBC (ref 0–0.2)
PATH INTERP BLD-IMP: NORMAL
PLAT MORPH BLD: NORMAL
PLATELET # BLD AUTO: 340 10*3/MM3 (ref 140–450)
PMV BLD AUTO: 8.8 FL (ref 6–12)
POTASSIUM SERPL-SCNC: 3.7 MMOL/L (ref 3.5–5.2)
PROT SERPL-MCNC: 5.6 G/DL (ref 6–8.5)
RBC # BLD AUTO: 4.21 10*6/MM3 (ref 3.77–5.28)
RBC MORPH BLD: NORMAL
SODIUM SERPL-SCNC: 141 MMOL/L (ref 136–145)
VARIANT LYMPHS NFR BLD MANUAL: 45 % (ref 19.6–45.3)
VARIANT LYMPHS NFR BLD MANUAL: 9 % (ref 0–5)
WBC MORPH BLD: NORMAL
WBC NRBC COR # BLD: 10.43 10*3/MM3 (ref 3.4–10.8)

## 2023-06-15 PROCEDURE — 85060 BLOOD SMEAR INTERPRETATION: CPT | Performed by: INTERNAL MEDICINE

## 2023-06-15 PROCEDURE — 25010000002 PIPERACILLIN SOD-TAZOBACTAM PER 1 G: Performed by: INTERNAL MEDICINE

## 2023-06-15 PROCEDURE — G0378 HOSPITAL OBSERVATION PER HR: HCPCS

## 2023-06-15 PROCEDURE — 85025 COMPLETE CBC W/AUTO DIFF WBC: CPT | Performed by: INTERNAL MEDICINE

## 2023-06-15 PROCEDURE — 80053 COMPREHEN METABOLIC PANEL: CPT | Performed by: INTERNAL MEDICINE

## 2023-06-15 RX ORDER — NITROFURANTOIN 25; 75 MG/1; MG/1
100 CAPSULE ORAL 2 TIMES DAILY
Qty: 6 CAPSULE | Refills: 0 | Status: SHIPPED | OUTPATIENT
Start: 2023-06-15 | End: 2023-06-18

## 2023-06-15 RX ORDER — ONDANSETRON 4 MG/1
4 TABLET, FILM COATED ORAL EVERY 8 HOURS PRN
Qty: 15 TABLET | Refills: 0 | Status: SHIPPED | OUTPATIENT
Start: 2023-06-15

## 2023-06-15 RX ADMIN — SODIUM CHLORIDE 100 ML/HR: 9 INJECTION, SOLUTION INTRAVENOUS at 07:25

## 2023-06-15 RX ADMIN — TAZOBACTAM SODIUM AND PIPERACILLIN SODIUM 3.38 G: 375; 3 INJECTION, SOLUTION INTRAVENOUS at 01:40

## 2023-06-15 RX ADMIN — BUSPIRONE HYDROCHLORIDE 10 MG: 10 TABLET ORAL at 08:12

## 2023-06-15 RX ADMIN — TAZOBACTAM SODIUM AND PIPERACILLIN SODIUM 3.38 G: 375; 3 INJECTION, SOLUTION INTRAVENOUS at 08:12

## 2023-06-15 RX ADMIN — HYDROCODONE BITARTRATE AND ACETAMINOPHEN 1 TABLET: 5; 325 TABLET ORAL at 01:43

## 2023-06-15 RX ADMIN — FLUOXETINE 20 MG: 20 CAPSULE ORAL at 08:12

## 2023-06-15 RX ADMIN — HYDROCODONE BITARTRATE AND ACETAMINOPHEN 1 TABLET: 5; 325 TABLET ORAL at 10:36

## 2023-06-15 NOTE — OUTREACH NOTE
Prep Survey      Flowsheet Row Responses   [facility] patient discharged from? [facility]   Is LACE score < 7 ? Yes   Eligibility [facility]   Date of Admission 06/14/23   Date of Discharge 06/15/23   Discharge Disposition Home or Self Care   Discharge diagnosis SIRS (systemic inflammatory response syndrome)   Does the patient have one of the following disease processes/diagnoses(primary or secondary)? Other   Does the patient have Home health ordered? No   Is there a DME ordered? No   Prep survey completed? Yes            Lilliana ALEJANDRO - Registered Nurse

## 2023-06-15 NOTE — DISCHARGE SUMMARY
[facility] Medicine Services  DISCHARGE SUMMARY    Patient Name: Shannan Sharp  : 1985  MRN: [id number]    Date of Admission: 2023  3:14 AM  Date of Discharge:  6/15/2023  Primary Care Physician: Pamella Andujar APRN    Consults       No orders found for last 30 day(s).            Hospital Course     Presenting Problem: Nausea/Vomiting/Abdominal Pain    Active Hospital Problems    Diagnosis  POA    **SIRS (systemic inflammatory response syndrome) [R65.10]  Yes    Leukocytosis [D72.829]  Yes    Lactic acidosis [E87.20]  Yes    Situational anxiety [F41.8]  Yes    Essential hypertension [I10]  Yes      Resolved Hospital Problems   No resolved problems to display.          Hospital Course:  Shannan Sharp is a 38 y.o. female with history of hypertension, hyperlipidemia and anxiety who presents to the ED with one day history of diffuse abdominal pain, diarrhea, nausea and vomiting     SIRS -resolved  -Patient presented with diffuse abdominal pain, nausea/vomiting and significant leukocytosis, lactic acidosis, source of infection is not entirely clear, however suspect combination of UTI and probable viral gastroenteritis  -UA with 3+ bacteria, culture is pending, will send home on 3 days of empiric macrobid and f/u on culture.  -CT abdomen/pelvis is unremarkable, transvaginal ultrasound is negative, CXR negative. Blood cultures, GI PCR and Respiratory PCR are negative, Procal negative. Work-up is essentially unremarkable.  -Leukocytosis has resolved with abx, suspect elevation was partially reactive in nature given rapid improvement  -tolerating regular diet, no further nausea/vomiting  -stable for discharge home with PCP f/u in 1 week. Patient aware to return with any worsening of symptoms     Hypertension  -resume home regimen     Anxiety and depression  -Continue BuSpar, Xanax     Hyperlipidemia    Discharge Follow Up Recommendations for outpatient  labs/diagnostics:  - f/u with PCP in one week    Day of Discharge     HPI:   Patient feeling much improved. No further nausea or diarrhea. No vomiting, tolerated regular lunch. Having some ongoing mild right sided back pain but otherwise, no fever/chills.    Review of Systems  Gen- No fevers, chills  CV- No chest pain, palpitations  Resp- No cough, dyspnea  GI- No N/V/D, abd pain    Vital Signs:   Temp:  [97.8 °F (36.6 °C)-98.2 °F (36.8 °C)] 98.2 °F (36.8 °C)  Heart Rate:  [70-80] 80  Resp:  [16] 16  BP: (111-126)/(78-95) 124/95      Physical Exam:  Constitutional: No acute distress, awake, alert  HENT: NCAT, mucous membranes moist  Respiratory: Clear to auscultation bilaterally, respiratory effort normal   Cardiovascular: RRR, no murmurs, rubs, or gallops  Gastrointestinal: Positive bowel sounds, soft, very mildly tender over pubic area, nondistended  Musculoskeletal: No bilateral ankle edema  Psychiatric: Appropriate affect, cooperative  Neurologic: Oriented x 3, strength symmetric in all extremities, Cranial Nerves grossly intact to confrontation, speech clear  Skin: No rashes      Pertinent  and/or Most Recent Results     LAB RESULTS:      Lab 06/15/23  0516 06/14/23  0901 06/14/23  0627 06/14/23  0340   WBC 10.43  --  19.68* 27.18*   HEMOGLOBIN 12.8  --  13.6 16.8*   HEMATOCRIT 38.9  --  40.0 49.9*   PLATELETS 340  --  403 489*   NEUTROS ABS 4.07  --  17.13* 23.78*   IMMATURE GRANS (ABS)  --   --  0.10* 0.10*   LYMPHS ABS  --   --  1.86 1.98   MONOS ABS  --   --  0.46 1.06*   EOS ABS 0.52*  --  0.07 0.18   MCV 92.4  --  89.5 89.1   PROCALCITONIN  --   --   --  0.06   LACTATE  --  2.9* 3.7* 3.6*         Lab 06/15/23  0516 06/14/23  0340   SODIUM 141 142   POTASSIUM 3.7 3.9   CHLORIDE 110* 100   CO2 25.0 26.0   ANION GAP 6.0 16.0*   BUN 6 14   CREATININE 0.67 0.78   EGFR 115.6 100.5   GLUCOSE 96 123*   CALCIUM 8.2* 10.3   MAGNESIUM  --  1.9   PHOSPHORUS  --  2.9         Lab 06/15/23  0516 06/14/23  0340   TOTAL  PROTEIN 5.6* 8.2   ALBUMIN 3.3* 4.6   GLOBULIN 2.3 3.6   ALT (SGPT) 20 24   AST (SGOT) 20 22   BILIRUBIN 0.4 0.4   ALK PHOS 27* 51   LIPASE  --  22                     Brief Urine Lab Results  (Last result in the past 365 days)        Color   Clarity   Blood   Leuk Est   Nitrite   Protein   CREAT   Urine HCG        06/14/23 0844 Yellow   Cloudy   Moderate (2+)   Negative   Negative   Trace                 Microbiology Results (last 10 days)       Procedure Component Value - Date/Time    Gastrointestinal Panel, PCR - Stool, Per Rectum [[id number]]  (Normal) Collected: 06/14/23 1639    Lab Status: Final result Specimen: Stool from Per Rectum Updated: 06/14/23 2020     Campylobacter Not Detected     Plesiomonas shigelloides Not Detected     Salmonella Not Detected     Vibrio Not Detected     Vibrio cholerae Not Detected     Yersinia enterocolitica Not Detected     Enteroaggregative E. coli (EAEC) Not Detected     Enteropathogenic E. coli (EPEC) Not Detected     Enterotoxigenic E. coli (ETEC) lt/st Not Detected     Shiga-like toxin-producing E. coli (STEC) stx1/stx2 Not Detected     Shigella/Enteroinvasive E. coli (EIEC) Not Detected     Cryptosporidium Not Detected     Cyclospora cayetanensis Not Detected     Entamoeba histolytica Not Detected     Giardia lamblia Not Detected     Adenovirus F40/41 Not Detected     Astrovirus Not Detected     Norovirus GI/GII Not Detected     Rotavirus A Not Detected     Sapovirus (I, II, IV or V) Not Detected    Blood Culture - Blood, Arm, Left [[id number]]  (Normal) Collected: 06/14/23 0901    Lab Status: Preliminary result Specimen: Blood from Arm, Left Updated: 06/15/23 0947     Blood Culture No growth at 24 hours    COVID PRE-OP / PRE-PROCEDURE SCREENING ORDER (NO ISOLATION) - Swab, Nasopharynx [[id number]]  (Normal) Collected: 06/14/23 0808    Lab Status: Final result Specimen: Swab from Nasopharynx Updated: 06/14/23 0924    Narrative:      The following orders were created for panel  order COVID PRE-OP / PRE-PROCEDURE SCREENING ORDER (NO ISOLATION) - Swab, Nasopharynx.  Procedure                               Abnormality         Status                     ---------                               -----------         ------                     Respiratory Panel PCR w/...[[id number]]  Normal              Final result                 Please view results for these tests on the individual orders.    Respiratory Panel PCR w/COVID-19(SARS-CoV-2) LEAH/LAVERN/ZAC/PAD/COR/MAD/ERMA In-House, NP Swab in UTM/VTM, 3-4 HR TAT - Swab, Nasopharynx [[id number]]  (Normal) Collected: 06/14/23 0808    Lab Status: Final result Specimen: Swab from Nasopharynx Updated: 06/14/23 0924     ADENOVIRUS, PCR Not Detected     Coronavirus 229E Not Detected     Coronavirus HKU1 Not Detected     Coronavirus NL63 Not Detected     Coronavirus OC43 Not Detected     COVID19 Not Detected     Human Metapneumovirus Not Detected     Human Rhinovirus/Enterovirus Not Detected     Influenza A PCR Not Detected     Influenza B PCR Not Detected     Parainfluenza Virus 1 Not Detected     Parainfluenza Virus 2 Not Detected     Parainfluenza Virus 3 Not Detected     Parainfluenza Virus 4 Not Detected     RSV, PCR Not Detected     Bordetella pertussis pcr Not Detected     Bordetella parapertussis PCR Not Detected     Chlamydophila pneumoniae PCR Not Detected     Mycoplasma pneumo by PCR Not Detected    Narrative:      In the setting of a positive respiratory panel with a viral infection PLUS a negative procalcitonin without other underlying concern for bacterial infection, consider observing off antibiotics or discontinuation of antibiotics and continue supportive care. If the respiratory panel is positive for atypical bacterial infection (Bordetella pertussis, Chlamydophila pneumoniae, or Mycoplasma pneumoniae), consider antibiotic de-escalation to target atypical bacterial infection.    Blood Culture - Blood, Arm, Left [[id number]]  (Normal) Collected:  06/14/23 0807    Lab Status: Preliminary result Specimen: Blood from Arm, Left Updated: 06/15/23 0931     Blood Culture No growth at 24 hours            US Non-ob Transvaginal    Result Date: 6/14/2023  US NON-OB TRANSVAGINAL, US TESTICULAR OR OVARIAN VASCULAR LIMITED Date of Exam: 6/14/2023 8:21 AM EDT Indication: Intermittent severe right lower quadrant pain, rising lactate, leukocytosis. Comparison: No comparisons available. Technique: Transvaginal pelvic ultrasound was performed.  Grayscale and color Doppler imaging was utilized to evaluate the pelvic area with image documentation per protocol. Findings: The uterus measures 7.1 x 3.0 x 4.2 cm and contains a 1.8 cm hypoechoic mass along the posterior uterine body, likely a fibroid. The endometrial stripe thickness measures 1.4 mm. Both ovaries appear normal with normal appearing flow. The right ovary measures 1.9 x 1.4 x 1.7 cm, while the left ovary measures 3.2 x 2.0 x 2.6 cm.     Impression: 1.1.8 cm mass along posterior uterine body, likely a fibroid. 2.Both adnexa appear within normal limits. Electronically Signed: Isaias Irene  6/14/2023 8:58 AM EDT  Workstation ID: [id number]    CT Abdomen Pelvis With Contrast    Result Date: 6/14/2023  EXAMINATION: CT ABDOMEN AND PELVIS WITH IV CONTRAST   DATE OF EXAMINATION: 6/14/2023. COMPARISON: None available. INDICATION: Nausea and vomiting with right lower quadrant pain. PROCEDURE:  Axial CT of the abdomen and pelvis was performed with contrast and sagittal and coronal reformatted images were performed.  85 mL of Isovue-300 was given intravenously. CT dose lowering techniques were used, to include: automated exposure control, adjustment for patient size, and/or use of iterative reconstruction. FINDINGS: LOWER CHEST :  The visualized lung bases are clear.  There are no pleural or pericardial effusions. ABDOMEN: Liver and Biliary system:  Normal. Adrenal glands:  Normal. Kidneys and ureters: Normal. Spleen:  Normal.  Pancreas:  Normal. Gallbladder:  Surgically absent. Lymph nodes, Peritoneum and mesentery:  There is no mesenteric or retroperitoneal lymphadenopathy. Gastrointestinal tract:  There are no dilated loops of bowel or free intraperitoneal air.    The appendix is normal. Aorta/IVC:   No aortic aneurysm.  IVC normal. Abdominal wall:  Normal. PELVIS: Fluid: There is no free fluid in the pelvis. Lymph Nodes:  There is no pelvic or inguinal lymphadenopathy.. Urinary bladder:  Normal. BONES:  There are no osseous destructive lesions.. ADDITIONAL  SIGNIFICANT FINDINGS:  None.     No acute process within the abdomen or pelvis. Electronically signed by:  Raphael Horn D.O.  6/14/2023 2:47 AM Mountain Time    XR Chest 1 View    Result Date: 6/14/2023  XR CHEST 1 VW Date of Exam: 6/14/2023 8:17 AM EDT Indication: sepsis, infectious workup. Comparison: 1/12/2023 Findings: The heart size is normal and the lungs appear clear     Impression: No active disease Electronically Signed: Chi Cintron  6/14/2023 9:27 AM EDT  Workstation ID: [id number]    US Testicular or Ovarian Vascular Limited    Result Date: 6/14/2023  US NON-OB TRANSVAGINAL, US TESTICULAR OR OVARIAN VASCULAR LIMITED Date of Exam: 6/14/2023 8:21 AM EDT Indication: Intermittent severe right lower quadrant pain, rising lactate, leukocytosis. Comparison: No comparisons available. Technique: Transvaginal pelvic ultrasound was performed.  Grayscale and color Doppler imaging was utilized to evaluate the pelvic area with image documentation per protocol. Findings: The uterus measures 7.1 x 3.0 x 4.2 cm and contains a 1.8 cm hypoechoic mass along the posterior uterine body, likely a fibroid. The endometrial stripe thickness measures 1.4 mm. Both ovaries appear normal with normal appearing flow. The right ovary measures 1.9 x 1.4 x 1.7 cm, while the left ovary measures 3.2 x 2.0 x 2.6 cm.     Impression: 1.1.8 cm mass along posterior uterine body, likely a fibroid. 2.Both adnexa  appear within normal limits. Electronically Signed: Isaias Teto  6/14/2023 8:58 AM EDT  Workstation ID: [id number]     Results for orders placed during the hospital encounter of 02/14/19    Duplex Renal Artery - Bilateral Complete CAR    Interpretation Summary  EXAMINATION: DUPLEX IMAGING OF THE RENAL ARTERIES - 02/14/2019    HISTORY: Hypertension    FINDINGS: The maximum velocity of flow in the abdominal aorta is 125  cm/s. Maximum velocity in the right renal artery is 91.4 cm/s and in the  left renal artery 117 cm/s.  This results in ratios of right aortic  velocity of 0.73 and on the left 0.94. The resistive indices are normal  bilaterally.    Impression  There is no evidence of renal artery stenosis. The resistive  indices are normal bilaterally measuring 0.54 on the right and 0.53 on  the left.    D:  02/14/2019  E:  02/14/2019    This report was finalized on 2/14/2019 4:43 PM by Dr. David Chung MD.      Results for orders placed during the hospital encounter of 02/14/19    Duplex Renal Artery - Bilateral Complete CAR    Interpretation Summary  EXAMINATION: DUPLEX IMAGING OF THE RENAL ARTERIES - 02/14/2019    HISTORY: Hypertension    FINDINGS: The maximum velocity of flow in the abdominal aorta is 125  cm/s. Maximum velocity in the right renal artery is 91.4 cm/s and in the  left renal artery 117 cm/s.  This results in ratios of right aortic  velocity of 0.73 and on the left 0.94. The resistive indices are normal  bilaterally.    Impression  There is no evidence of renal artery stenosis. The resistive  indices are normal bilaterally measuring 0.54 on the right and 0.53 on  the left.    D:  02/14/2019  E:  02/14/2019    This report was finalized on 2/14/2019 4:43 PM by Dr. David Chung MD.          Plan for Follow-up of Pending Labs/Results:   Pending Labs       Order Current Status    Urine Culture - Urine, Urine, Clean Catch In process    Blood Culture - Blood, Arm, Left Preliminary result    Blood Culture  - Blood, Arm, Left Preliminary result          Discharge Details        Discharge Medications        New Medications        Instructions Start Date   nitrofurantoin (macrocrystal-monohydrate) 100 MG capsule  Commonly known as: Macrobid   100 mg, Oral, 2 Times Daily      ondansetron 4 MG tablet  Commonly known as: Zofran   4 mg, Oral, Every 8 Hours PRN             Continue These Medications        Instructions Start Date   albuterol sulfate  (90 Base) MCG/ACT inhaler  Commonly known as: PROVENTIL HFA;VENTOLIN HFA;PROAIR HFA   2 puffs, Inhalation, Every 4 Hours PRN      ALPRAZolam 0.5 MG tablet  Commonly known as: XANAX   0.5 mg, Oral, 2 Times Daily PRN      busPIRone 10 MG tablet  Commonly known as: BUSPAR   TAKE 1 TABLET BY MOUTH 3  TIMES DAILY AS NEEDED FOR  ANXIETY      Daily Vitamin tablet tablet  Generic drug: multivitamin   1 tablet, Oral, Daily      fluorometholone 0.1 % ophthalmic suspension  Commonly known as: FML   1 drop, Both Eyes, Daily      FLUoxetine 20 MG capsule  Commonly known as: PROzac   20 mg, Oral, Daily      hydroCHLOROthiazide 25 MG tablet  Commonly known as: HYDRODIURIL   25 mg, Oral, Daily      lisinopril 20 MG tablet  Commonly known as: PRINIVIL,ZESTRIL   20 mg, Oral, Daily      promethazine-dextromethorphan 6.25-15 MG/5ML syrup  Commonly known as: PROMETHAZINE-DM   5 mL, Oral, 4 Times Daily PRN      Tri-Sprintec 0.18/0.215/0.25 MG-35 MCG per tablet  Generic drug: norgestimate-ethinyl estradiol   1 tablet, Oral, Daily             Stop These Medications      doxycycline 100 MG tablet  Commonly known as: VIBRAMYICN              Allergies   Allergen Reactions    Erythromycin          Discharge Disposition:  Home or Self Care    Diet:  Hospital:  Diet Order   Procedures    Diet: Regular/House Diet; Texture: Regular Texture (IDDSI 7); Fluid Consistency: Thin (IDDSI 0)       Activity:  Activity Instructions    Activity as tolerated           Restrictions or Other Recommendations:  - none        CODE STATUS:    Code Status and Medical Interventions:   Ordered at: 06/14/23 1017     Level Of Support Discussed With:    Patient     Code Status (Patient has no pulse and is not breathing):    CPR (Attempt to Resuscitate)     Medical Interventions (Patient has pulse or is breathing):    Full Support       Future Appointments   Date Time Provider Department Center   6/21/2023 10:45 AM Pamella Andujar APRN [facility]   7/21/2023  3:00 PM Pamella Andujar APRN [facility]   11/13/2023  8:20 AM Sudheer Hamlin MD [facility]                 HUMZA Kwon  06/15/23      Time Spent on Discharge:  I spent  35 minutes on this discharge activity which included: face-to-face encounter with the patient, reviewing the data in the system, coordination of the care with the nursing staff as well as consultants, documentation, and entering orders.

## 2023-06-15 NOTE — PLAN OF CARE
Problem: Adult Inpatient Plan of Care  Goal: Plan of Care Review  Outcome: Ongoing, Progressing  Flowsheets (Taken 6/15/2023 0539)  Plan of Care Reviewed With: patient  Goal: Patient-Specific Goal (Individualized)  Outcome: Ongoing, Progressing  Goal: Absence of Hospital-Acquired Illness or Injury  Outcome: Ongoing, Progressing  Intervention: Identify and Manage Fall Risk  Recent Flowsheet Documentation  Taken 6/15/2023 0400 by Ameena Russ RN  Safety Promotion/Fall Prevention:   activity supervised   clutter free environment maintained   room organization consistent   safety round/check completed  Taken 6/15/2023 0200 by Ameena Russ RN  Safety Promotion/Fall Prevention: activity supervised  Taken 6/15/2023 0000 by Ameena Russ RN  Safety Promotion/Fall Prevention:   activity supervised   assistive device/personal items within reach   fall prevention program maintained   room organization consistent   safety round/check completed  Taken 6/14/2023 2200 by Ameena Russ RN  Safety Promotion/Fall Prevention:   activity supervised   assistive device/personal items within reach   room organization consistent   safety round/check completed  Taken 6/14/2023 2000 by Ameena Russ RN  Safety Promotion/Fall Prevention:   activity supervised   clutter free environment maintained   fall prevention program maintained   room organization consistent   safety round/check completed  Intervention: Prevent Skin Injury  Recent Flowsheet Documentation  Taken 6/15/2023 0400 by Ameena Russ RN  Body Position: position changed independently  Taken 6/15/2023 0200 by Ameena Russ RN  Body Position: position changed independently  Taken 6/15/2023 0000 by Ameena Russ RN  Body Position: position changed independently  Taken 6/14/2023 2200 by Ameena Russ RN  Body Position: position changed independently  Taken 6/14/2023 2000 by Ameena Russ RN  Body Position: position changed independently  Intervention: Prevent  and Manage VTE (Venous Thromboembolism) Risk  Recent Flowsheet Documentation  Taken 6/14/2023 2000 by Ameena Russ RN  Activity Management: activity encouraged  Range of Motion: active ROM (range of motion) encouraged  Intervention: Prevent Infection  Recent Flowsheet Documentation  Taken 6/15/2023 0400 by Ameena Russ RN  Infection Prevention:   environmental surveillance performed   personal protective equipment utilized  Taken 6/15/2023 0000 by Ameena Russ RN  Infection Prevention:   environmental surveillance performed   personal protective equipment utilized  Taken 6/14/2023 2200 by Ameena Russ RN  Infection Prevention: environmental surveillance performed  Taken 6/14/2023 2000 by Ameena Russ RN  Infection Prevention: environmental surveillance performed  Goal: Optimal Comfort and Wellbeing  Outcome: Ongoing, Progressing  Intervention: Provide Person-Centered Care  Recent Flowsheet Documentation  Taken 6/14/2023 2000 by Ameena Russ RN  Trust Relationship/Rapport: care explained  Goal: Readiness for Transition of Care  Outcome: Ongoing, Progressing     Problem: Behavioral Health Comorbidity  Goal: Maintenance of Behavioral Health Symptom Control  Outcome: Ongoing, Progressing  Intervention: Maintain Behavioral Health Symptom Control  Recent Flowsheet Documentation  Taken 6/15/2023 0400 by Ameena Russ RN  Medication Review/Management: medications reviewed  Taken 6/15/2023 0200 by Ameena Russ RN  Medication Review/Management: medications reviewed  Taken 6/15/2023 0000 by Ameena Russ RN  Medication Review/Management: medications reviewed  Taken 6/14/2023 2000 by Ameena Russ RN  Medication Review/Management: medications reviewed   Goal Outcome Evaluation:  Plan of Care Reviewed With: patient

## 2023-06-15 NOTE — PLAN OF CARE
Problem: Adult Inpatient Plan of Care  Goal: Plan of Care Review  Outcome: Ongoing, Progressing  Goal: Patient-Specific Goal (Individualized)  Outcome: Ongoing, Progressing  Goal: Absence of Hospital-Acquired Illness or Injury  Outcome: Ongoing, Progressing  Intervention: Identify and Manage Fall Risk  Recent Flowsheet Documentation  Taken 6/14/2023 1800 by Darlin Huitron RN  Safety Promotion/Fall Prevention:   assistive device/personal items within reach   clutter free environment maintained  Intervention: Prevent Skin Injury  Recent Flowsheet Documentation  Taken 6/14/2023 1800 by Darlin Huitron RN  Body Position: position changed independently  Intervention: Prevent and Manage VTE (Venous Thromboembolism) Risk  Recent Flowsheet Documentation  Taken 6/14/2023 1800 by Darlin Huitron RN  Activity Management: activity encouraged  Goal: Optimal Comfort and Wellbeing  Outcome: Ongoing, Progressing  Goal: Readiness for Transition of Care  Outcome: Ongoing, Progressing     Problem: Behavioral Health Comorbidity  Goal: Maintenance of Behavioral Health Symptom Control  Outcome: Ongoing, Progressing  Intervention: Maintain Behavioral Health Symptom Control  Recent Flowsheet Documentation  Taken 6/14/2023 1800 by Darlin Huitron RN  Medication Review/Management: medications reviewed   Goal Outcome Evaluation:

## 2023-06-15 NOTE — PLAN OF CARE
Problem: Adult Inpatient Plan of Care  Goal: Plan of Care Review  6/15/2023 1506 by Darlin Huitron RN  Outcome: Met  6/15/2023 0718 by Darlin Huitron RN  Outcome: Ongoing, Progressing  Goal: Patient-Specific Goal (Individualized)  6/15/2023 1506 by Darlin Huitron RN  Outcome: Met  6/15/2023 0718 by Darlin Huitron RN  Outcome: Ongoing, Progressing  Goal: Absence of Hospital-Acquired Illness or Injury  6/15/2023 1506 by Darlin Huitron RN  Outcome: Met  6/15/2023 0718 by Darlin Huitron RN  Outcome: Ongoing, Progressing  Intervention: Identify and Manage Fall Risk  Recent Flowsheet Documentation  Taken 6/15/2023 1400 by Darlin Huitron RN  Safety Promotion/Fall Prevention:   assistive device/personal items within reach   clutter free environment maintained  Taken 6/15/2023 1200 by Darlin Huitron RN  Safety Promotion/Fall Prevention:   assistive device/personal items within reach   clutter free environment maintained  Taken 6/15/2023 1000 by Darlin Huitron RN  Safety Promotion/Fall Prevention:   assistive device/personal items within reach   clutter free environment maintained  Taken 6/15/2023 0815 by Darlin Huitron RN  Safety Promotion/Fall Prevention:   assistive device/personal items within reach   clutter free environment maintained  Intervention: Prevent Skin Injury  Recent Flowsheet Documentation  Taken 6/15/2023 1400 by Darlin Huitron RN  Body Position: 30 degrees  Taken 6/15/2023 1200 by Darlin Huitron RN  Body Position: position changed independently  Taken 6/15/2023 1000 by Darlin Huitron RN  Body Position: position changed independently  Taken 6/15/2023 0815 by Darlin Huitron RN  Body Position: position changed independently  Skin Protection: adhesive use limited  Intervention: Prevent and Manage VTE (Venous Thromboembolism) Risk  Recent Flowsheet Documentation  Taken 6/15/2023 1400 by Darlin Huitron RN  Activity Management: activity encouraged  Taken 6/15/2023 1200 by Elana  JACOB Saeed  Activity Management: activity encouraged  Range of Motion: active ROM (range of motion) encouraged  Taken 6/15/2023 1000 by Darlin Huitron RN  Activity Management: activity encouraged  Taken 6/15/2023 0815 by Darlin Huitron RN  Activity Management: activity encouraged  Range of Motion: active ROM (range of motion) encouraged  Intervention: Prevent Infection  Recent Flowsheet Documentation  Taken 6/15/2023 1400 by Darlin Huitron RN  Infection Prevention: environmental surveillance performed  Taken 6/15/2023 1200 by Darlin Huitron RN  Infection Prevention: environmental surveillance performed  Taken 6/15/2023 1000 by Darlin Huitron RN  Infection Prevention: environmental surveillance performed  Taken 6/15/2023 0815 by Darlin Huitron RN  Infection Prevention: environmental surveillance performed  Goal: Optimal Comfort and Wellbeing  6/15/2023 1506 by Darlin Huitron RN  Outcome: Met  6/15/2023 0718 by Darlin Huitron RN  Outcome: Ongoing, Progressing  Intervention: Provide Person-Centered Care  Recent Flowsheet Documentation  Taken 6/15/2023 1200 by Darlin Huitron RN  Trust Relationship/Rapport: care explained  Goal: Readiness for Transition of Care  6/15/2023 1506 by Darlin Huitron RN  Outcome: Met  6/15/2023 0718 by Darlin Huitron RN  Outcome: Ongoing, Progressing     Problem: Behavioral Health Comorbidity  Goal: Maintenance of Behavioral Health Symptom Control  6/15/2023 1506 by Darlin Huitron RN  Outcome: Met  6/15/2023 0718 by Darlin Huitron RN  Outcome: Ongoing, Progressing  Intervention: Maintain Behavioral Health Symptom Control  Recent Flowsheet Documentation  Taken 6/15/2023 1400 by Darlin Huitron RN  Medication Review/Management: medications reviewed  Taken 6/15/2023 1200 by Darlin Huitron RN  Medication Review/Management: medications reviewed  Taken 6/15/2023 1000 by Darlin Huitron RN  Medication Review/Management: medications reviewed  Taken 6/15/2023 0815 by  Darlin Huitron RN  Medication Review/Management: medications reviewed   Goal Outcome Evaluation:

## 2023-06-16 ENCOUNTER — TRANSITIONAL CARE MANAGEMENT TELEPHONE ENCOUNTER (OUTPATIENT)
Dept: CALL CENTER | Facility: HOSPITAL | Age: 38
End: 2023-06-16
Payer: COMMERCIAL

## 2023-06-16 LAB — BACTERIA SPEC AEROBE CULT: NORMAL

## 2023-06-16 NOTE — OUTREACH NOTE
Call Center TCM Note      Flowsheet Row Responses   [facility] patient discharged from? [place]   Does the patient have one of the following disease processes/diagnoses(primary or secondary)? Other   TCM attempt successful? Yes   Call start time 1158   Call end time 1206   Discharge diagnosis SIRS (systemic inflammatory response syndrome) suspect combination of UTI and probable viral gastroenteritis   Person spoke with today (if not patient) and relationship Patient   Meds reviewed with patient/caregiver? Yes  [New: macrobid and zofran]   Does the patient have all medications ordered at discharge? Yes   Is the patient taking all medications as directed (includes completed medication regime)? Yes   Comments Hospital Follow Up with HUMZA Diaz Wednesday Jun 21, 2023 10:45 AM   Does the patient have an appointment with their PCP within 7 days of discharge? Yes   Has home health visited the patient within 72 hours of discharge? N/A   Psychosocial issues? No   Did the patient receive a copy of their discharge instructions? Yes   Nursing interventions Reviewed instructions with patient   What is the patient's perception of their health status since discharge? Improving  [Reports still has mild abdominal cramping type of pain, loose stool. Tolerating food and fluids ok. ]   Is the patient/caregiver able to teach back signs and symptoms related to disease process for when to call PCP? Yes   Is the patient/caregiver able to teach back signs and symptoms related to disease process for when to call 911? Yes   Is the patient/caregiver able to teach back the hierarchy of who to call/visit for symptoms/problems? PCP, Specialist, Home health nurse, Urgent Care, ED, 911 Yes   If the patient is a current smoker, are they able to teach back resources for cessation? Not a smoker   TCM call completed? Yes   Call end time 1206   Would this patient benefit from a Referral to [facility] Social Work? No   Is the patient  interested in additional calls from an ambulatory ?  NOTE:  applies to high risk patients requiring additional follow-up. No            Danisha Tiwari RN    6/16/2023, 12:06 EDT

## 2023-06-19 LAB
BACTERIA SPEC AEROBE CULT: NORMAL
BACTERIA SPEC AEROBE CULT: NORMAL

## 2023-06-23 PROBLEM — D72.829 LEUKOCYTOSIS: Status: RESOLVED | Noted: 2023-06-14 | Resolved: 2023-06-23

## 2023-07-21 PROBLEM — E87.20 LACTIC ACIDOSIS: Status: RESOLVED | Noted: 2023-06-14 | Resolved: 2023-07-21

## 2023-07-22 DIAGNOSIS — F41.1 GAD (GENERALIZED ANXIETY DISORDER): ICD-10-CM

## 2023-07-22 DIAGNOSIS — I10 ESSENTIAL HYPERTENSION: ICD-10-CM

## 2023-07-23 PROBLEM — R65.10 SIRS (SYSTEMIC INFLAMMATORY RESPONSE SYNDROME): Status: RESOLVED | Noted: 2023-06-14 | Resolved: 2023-07-23

## 2023-07-24 RX ORDER — FLUOXETINE HYDROCHLORIDE 20 MG/1
20 CAPSULE ORAL DAILY
Qty: 90 CAPSULE | Refills: 3 | Status: SHIPPED | OUTPATIENT
Start: 2023-07-24

## 2023-07-24 RX ORDER — HYDROCHLOROTHIAZIDE 25 MG/1
25 TABLET ORAL DAILY
Qty: 90 TABLET | Refills: 3 | Status: SHIPPED | OUTPATIENT
Start: 2023-07-24

## 2023-07-25 ENCOUNTER — PRIOR AUTHORIZATION (OUTPATIENT)
Dept: INTERNAL MEDICINE | Facility: CLINIC | Age: 38
End: 2023-07-25
Payer: COMMERCIAL

## 2023-07-25 NOTE — TELEPHONE ENCOUNTER
Received PA for wegovy. Submitted via [facility]. Key: [id number]. Pending response.    No Vaccines Administered.

## 2023-07-26 NOTE — TELEPHONE ENCOUNTER
"PA denied due to plan not covering weight loss drugs.     \"The requested medication and/or diagnosis are not a covered benefit and excluded from coverage in  accordance with the terms and conditions of your plan benefit. Therefore, the request has been  administratively denied.\"  "

## 2023-08-07 RX ORDER — DILTIAZEM HYDROCHLORIDE 60 MG/1
TABLET, FILM COATED ORAL
Qty: 20.4 G | Refills: 5 | Status: SHIPPED | OUTPATIENT
Start: 2023-08-07

## 2023-08-21 ENCOUNTER — CONSULT (OUTPATIENT)
Dept: ONCOLOGY | Facility: CLINIC | Age: 38
End: 2023-08-21
Payer: COMMERCIAL

## 2023-08-21 ENCOUNTER — LAB (OUTPATIENT)
Dept: LAB | Facility: HOSPITAL | Age: 38
End: 2023-08-21
Payer: COMMERCIAL

## 2023-08-21 VITALS
HEIGHT: 64 IN | RESPIRATION RATE: 16 BRPM | DIASTOLIC BLOOD PRESSURE: 82 MMHG | OXYGEN SATURATION: 97 % | WEIGHT: 178 LBS | SYSTOLIC BLOOD PRESSURE: 125 MMHG | TEMPERATURE: 97.5 F | BODY MASS INDEX: 30.39 KG/M2 | HEART RATE: 81 BPM

## 2023-08-21 DIAGNOSIS — D72.820 LYMPHOCYTOSIS: ICD-10-CM

## 2023-08-21 DIAGNOSIS — D72.820 LYMPHOCYTOSIS: Primary | ICD-10-CM

## 2023-08-21 LAB
BASOPHILS # BLD AUTO: 0.05 10*3/MM3 (ref 0–0.2)
BASOPHILS NFR BLD AUTO: 0.4 % (ref 0–1.5)
CHROMATIN AB SERPL-ACNC: <10 IU/ML (ref 0–14)
CRP SERPL-MCNC: 1.09 MG/DL (ref 0–0.5)
DEPRECATED RDW RBC AUTO: 41.9 FL (ref 37–54)
EOSINOPHIL # BLD AUTO: 0.56 10*3/MM3 (ref 0–0.4)
EOSINOPHIL NFR BLD AUTO: 5 % (ref 0.3–6.2)
ERYTHROCYTE [DISTWIDTH] IN BLOOD BY AUTOMATED COUNT: 12.3 % (ref 12.3–15.4)
ERYTHROCYTE [SEDIMENTATION RATE] IN BLOOD: 11 MM/HR (ref 0–20)
FERRITIN SERPL-MCNC: 97.21 NG/ML (ref 13–150)
HAV IGM SERPL QL IA: NORMAL
HBV CORE IGM SERPL QL IA: NORMAL
HBV SURFACE AG SERPL QL IA: NORMAL
HCT VFR BLD AUTO: 42.4 % (ref 34–46.6)
HCV AB SER DONR QL: NORMAL
HGB BLD-MCNC: 14.3 G/DL (ref 12–15.9)
HIV 1+2 AB+HIV1 P24 AG SERPL QL IA: NORMAL
IMM GRANULOCYTES # BLD AUTO: 0.01 10*3/MM3 (ref 0–0.05)
IMM GRANULOCYTES NFR BLD AUTO: 0.1 % (ref 0–0.5)
IRON 24H UR-MRATE: 55 MCG/DL (ref 37–145)
IRON SATN MFR SERPL: 13 % (ref 20–50)
LYMPHOCYTES # BLD AUTO: 4.73 10*3/MM3 (ref 0.7–3.1)
LYMPHOCYTES NFR BLD AUTO: 42.2 % (ref 19.6–45.3)
MCH RBC QN AUTO: 30.8 PG (ref 26.6–33)
MCHC RBC AUTO-ENTMCNC: 33.7 G/DL (ref 31.5–35.7)
MCV RBC AUTO: 91.4 FL (ref 79–97)
MONOCYTES # BLD AUTO: 0.54 10*3/MM3 (ref 0.1–0.9)
MONOCYTES NFR BLD AUTO: 4.8 % (ref 5–12)
NEUTROPHILS NFR BLD AUTO: 47.5 % (ref 42.7–76)
NEUTROPHILS NFR BLD AUTO: 5.33 10*3/MM3 (ref 1.7–7)
PLATELET # BLD AUTO: 396 10*3/MM3 (ref 140–450)
PMV BLD AUTO: 8.8 FL (ref 6–12)
RBC # BLD AUTO: 4.64 10*6/MM3 (ref 3.77–5.28)
TIBC SERPL-MCNC: 438 MCG/DL (ref 298–536)
TRANSFERRIN SERPL-MCNC: 294 MG/DL (ref 200–360)
WBC NRBC COR # BLD: 11.22 10*3/MM3 (ref 3.4–10.8)

## 2023-08-21 PROCEDURE — 80074 ACUTE HEPATITIS PANEL: CPT

## 2023-08-21 PROCEDURE — 86038 ANTINUCLEAR ANTIBODIES: CPT

## 2023-08-21 PROCEDURE — 83540 ASSAY OF IRON: CPT

## 2023-08-21 PROCEDURE — 36415 COLL VENOUS BLD VENIPUNCTURE: CPT

## 2023-08-21 PROCEDURE — 86140 C-REACTIVE PROTEIN: CPT

## 2023-08-21 PROCEDURE — 82728 ASSAY OF FERRITIN: CPT

## 2023-08-21 PROCEDURE — 84466 ASSAY OF TRANSFERRIN: CPT

## 2023-08-21 PROCEDURE — 85652 RBC SED RATE AUTOMATED: CPT

## 2023-08-21 PROCEDURE — 86431 RHEUMATOID FACTOR QUANT: CPT

## 2023-08-21 PROCEDURE — 82746 ASSAY OF FOLIC ACID SERUM: CPT

## 2023-08-21 PROCEDURE — 82607 VITAMIN B-12: CPT

## 2023-08-21 PROCEDURE — 85025 COMPLETE CBC W/AUTO DIFF WBC: CPT

## 2023-08-21 PROCEDURE — G0432 EIA HIV-1/HIV-2 SCREEN: HCPCS

## 2023-08-21 NOTE — PROGRESS NOTES
DATE OF CONSULTATION: 8/21/2023    REFERRING PHYSICIAN: Pamella Andujar APRN    Dear Pamella Mills APRN  Thank you for asking for my medical advice on this patient. I saw her in the  [place] office on 8/21/2023    REASON FOR CONSULTATION: Lymphocytosis    PROBLEM LIST:   1.  Lymphocytosis:  A.  Mild and stable since 2019  2. Hypertension  3.  Type 2 diabetes without complications.    HISTORY OF PRESENT ILLNESS: The patient is a very pleasant 38 y.o.  female   who was in her usual state of health until July 2023.  Patient presented for routine follow-up visit with her primary care provider.  She had blood work done that revealed leukocytosis with elevated absolute lymphocyte count.  This has been also noted on previous labs.  The patient was referred to me for further recommendations.    SUBJECTIVE: When I saw the patient today she is here by herself.  She is doing fairly well.  She denies any fever chills night sweats.  She was admitted to the hospital in June with UTI.    Review of Systems   Constitutional:  Positive for fatigue.   Respiratory: Negative.     Cardiovascular: Negative.    Gastrointestinal: Negative.    Endocrine: Negative.    Genitourinary: Negative.    Musculoskeletal:  Positive for arthralgias.   Allergic/Immunologic: Negative.    Neurological: Negative.    Hematological: Negative.    Psychiatric/Behavioral: Negative.       Past Medical History:   Diagnosis Date    Essential hypertension 2019    Mixed hyperlipidemia 2019    SIRS (systemic inflammatory response syndrome) 06/14/2023    Situational anxiety 2022       Social History     Socioeconomic History    Marital status:    Tobacco Use    Smoking status: Never    Smokeless tobacco: Never   Vaping Use    Vaping Use: Never used   Substance and Sexual Activity    Alcohol use: Yes     Alcohol/week: 2.0 standard drinks     Types: 1 Glasses of wine, 1 Cans of beer per week    Drug use: No    Sexual activity: Not Currently      Partners: Male     Birth control/protection: Condom, Pill       Family History   Problem Relation Age of Onset    Hypertension Father     Diabetes Mother     Hypertension Mother     Lung cancer Paternal Grandfather     Coronary artery disease Maternal Grandmother     Alzheimer's disease Maternal Grandmother        Past Surgical History:   Procedure Laterality Date    LAPAROSCOPIC CHOLECYSTECTOMY  2009       Allergies   Allergen Reactions    Erythromycin           Current Outpatient Medications:     albuterol sulfate  (90 Base) MCG/ACT inhaler, Inhale 2 puffs Every 4 (Four) Hours As Needed for Wheezing or Shortness of Air., Disp: 18 g, Rfl: 0    ALPRAZolam (XANAX) 0.5 MG tablet, Take 1 tablet by mouth 2 (Two) Times a Day As Needed for Anxiety., Disp: 30 tablet, Rfl: 0    busPIRone (BUSPAR) 10 MG tablet, TAKE 1 TABLET BY MOUTH 3  TIMES DAILY AS NEEDED FOR  ANXIETY, Disp: 270 tablet, Rfl: 0    FLUoxetine (PROzac) 20 MG capsule, TAKE 1 CAPSULE BY MOUTH  DAILY, Disp: 90 capsule, Rfl: 3    hydroCHLOROthiazide (HYDRODIURIL) 25 MG tablet, TAKE 1 TABLET BY MOUTH  DAILY, Disp: 90 tablet, Rfl: 3    lisinopril (PRINIVIL,ZESTRIL) 20 MG tablet, TAKE 1 TABLET BY MOUTH  DAILY, Disp: 90 tablet, Rfl: 3    Multiple Vitamin (DAILY VITAMIN) tablet, Take 1 tablet by mouth Daily., Disp: , Rfl:     ondansetron (Zofran) 4 MG tablet, Take 1 tablet by mouth Every 8 (Eight) Hours As Needed for Nausea or Vomiting., Disp: 15 tablet, Rfl: 0    Scopolamine 1 MG/3DAYS patch, Place 1 patch on the skin as directed by provider Every 72 (Seventy-Two) Hours., Disp: 4 each, Rfl: 0    Semaglutide-Weight Management (Wegovy) 0.25 MG/0.5ML solution auto-injector, Inject 0.25 mg under the skin into the appropriate area as directed 1 (One) Time Per Week., Disp: 2 mL, Rfl: 0    Symbicort 80-4.5 MCG/ACT inhaler, USE 2 INHALATIONS BY MOUTH TWICE DAILY, Disp: 20.4 g, Rfl: 5    Tri-Sprintec 0.18/0.215/0.25 MG-35 MCG per tablet, Take 1 tablet by mouth Daily.,  "Disp: 84 tablet, Rfl: 4    PHYSICAL EXAMINATION:   /82   Pulse 81   Temp 97.5 øF (36.4 øC) (Infrared)   Resp 16   Ht 162.6 cm (64.02\")   Wt 80.7 kg (178 lb)   SpO2 97%   BMI 30.54 kg/mý   Pain Score    08/21/23 1452   PainSc: 0-No pain      ECOG score: 0            ECOG Performance Status: 1 - Symptomatic but completely ambulatory  General Appearance:  alert, cooperative, no apparent distress and appears stated age   Neurologic/Psychiatric: A&O x 3, gait steady, appropriate affect, strength 5/5 in all muscle groups   HEENT:  Normocephalic, without obvious abnormality, mucous membranes moist   Neck: Supple, symmetrical, trachea midline, no adenopathy;  No thyromegaly, masses, or tenderness   Lungs:   Clear to auscultation bilaterally; respirations regular, even, and unlabored bilaterally   Heart:  Regular rate and rhythm, no murmurs appreciated   Abdomen:   Soft, non-tender, non-distended, and no organomegaly   Lymph nodes: No cervical, supraclavicular, inguinal or axillary adenopathy noted   Extremities: Normal, atraumatic; no clubbing, cyanosis, or edema    Skin: No rashes, ulcers, or suspicious lesions noted       No visits with results within 2 Week(s) from this visit.   Latest known visit with results is:   Lab on 07/19/2023   Component Date Value Ref Range Status    WBC 07/19/2023 11.41 (H)  3.40 - 10.80 10*3/mm3 Final    RBC 07/19/2023 4.79  3.77 - 5.28 10*6/mm3 Final    Hemoglobin 07/19/2023 14.3  12.0 - 15.9 g/dL Final    Hematocrit 07/19/2023 42.5  34.0 - 46.6 % Final    MCV 07/19/2023 88.7  79.0 - 97.0 fL Final    MCH 07/19/2023 29.9  26.6 - 33.0 pg Final    MCHC 07/19/2023 33.6  31.5 - 35.7 g/dL Final    RDW 07/19/2023 12.3  12.3 - 15.4 % Final    RDW-SD 07/19/2023 40.1  37.0 - 54.0 fl Final    MPV 07/19/2023 9.5  6.0 - 12.0 fL Final    Platelets 07/19/2023 431  140 - 450 10*3/mm3 Final    Neutrophil % 07/19/2023 46.4  42.7 - 76.0 % Final    Lymphocyte % 07/19/2023 43.3  19.6 - 45.3 % Final "    Monocyte % 07/19/2023 4.8 (L)  5.0 - 12.0 % Final    Eosinophil % 07/19/2023 4.4  0.3 - 6.2 % Final    Basophil % 07/19/2023 0.8  0.0 - 1.5 % Final    Immature Grans % 07/19/2023 0.3  0.0 - 0.5 % Final    Neutrophils, Absolute 07/19/2023 5.30  1.70 - 7.00 10*3/mm3 Final    Lymphocytes, Absolute 07/19/2023 4.94 (H)  0.70 - 3.10 10*3/mm3 Final    Monocytes, Absolute 07/19/2023 0.55  0.10 - 0.90 10*3/mm3 Final    Eosinophils, Absolute 07/19/2023 0.50 (H)  0.00 - 0.40 10*3/mm3 Final    Basophils, Absolute 07/19/2023 0.09  0.00 - 0.20 10*3/mm3 Final    Immature Grans, Absolute 07/19/2023 0.03  0.00 - 0.05 10*3/mm3 Final    nRBC 07/19/2023 0.0  0.0 - 0.2 /100 WBC Final        No results found.      DIAGNOSTIC DATA:   1.  Extensive patient medical records including doctor notes blood results image reports reviewed by me and document the patient's chart.    ASSESSMENT: The patient is a very pleasant 38 y.o.  female  with lymphocytosis    PLAN:     1.  Lymphocytosis:  A.  I had a long discussion today with the patient about her  new diagnosis of lymphocytosis. I reviewed the patient's documents including refereing provider's notes, lab results, imaging report.   B.  I explained to the patient her lymphocyte count is slightly elevated and has been essentially stable over the last 4 years.  C.  While this could be a sign of lymphoproliferative neoplasm, his absolute lymphocyte count is not constantly above 5000.  If this is a clonal lymphocytosis it could be clonal B cells of unknown significance.  D.  I will check inflammatory markers autoimmune markers and infection work-up.  E.  I will send peripheral blood for flow cytometry in the future if her absolute lymphocyte count persistently stayed above 5000.  F.  She will follow-up with me in 1 month to go over the results.    2.  Hypertension:  A.  She will continue HCTZ and lisinopril.    3.  Type 2 diabetes without complications:  A.  Continue Wegovy      FOLLOW UP: 1  month to go over the results.    Snow Zepeda MD  8/21/2023

## 2023-08-22 LAB
ANA SER QL: NEGATIVE
FOLATE SERPL-MCNC: >20 NG/ML (ref 4.78–24.2)
VIT B12 BLD-MCNC: 701 PG/ML (ref 211–946)

## 2023-09-25 ENCOUNTER — OFFICE VISIT (OUTPATIENT)
Dept: ONCOLOGY | Facility: CLINIC | Age: 38
End: 2023-09-25

## 2023-09-25 VITALS
BODY MASS INDEX: 31.07 KG/M2 | SYSTOLIC BLOOD PRESSURE: 129 MMHG | HEART RATE: 70 BPM | OXYGEN SATURATION: 97 % | WEIGHT: 182 LBS | DIASTOLIC BLOOD PRESSURE: 80 MMHG | RESPIRATION RATE: 16 BRPM | TEMPERATURE: 97.5 F | HEIGHT: 64 IN

## 2023-09-25 DIAGNOSIS — D72.820 LYMPHOCYTOSIS: Primary | ICD-10-CM

## 2023-09-25 PROCEDURE — 99214 OFFICE O/P EST MOD 30 MIN: CPT | Performed by: INTERNAL MEDICINE

## 2023-09-25 NOTE — PROGRESS NOTES
"DATE OF VISIT: 9/25/2023    REASON FOR VISIT: Followup for leukocytosis     PROBLEM LIST:  1.  Lymphocytosis:  A.  Mild and stable since 2019  2.  Hypertension  3.  Type 2 diabetes without complications.    HISTORY OF PRESENT ILLNESS: The patient is a very pleasant 38 y.o. female  with past medical history significant for lymphocytosis diagnosed 2019. The  patient is here today for scheduled follow-up visit.    SUBJECTIVE: The patient is here today by herself.  All in all she is doing fairly well.  She denies any fever chills night sweats.  She is staying active.    Past History:  Medical History: has a past medical history of Essential hypertension (2019), Mixed hyperlipidemia (2019), SIRS (systemic inflammatory response syndrome) (06/14/2023), and Situational anxiety (2022).   Surgical History: has a past surgical history that includes Laparoscopic cholecystectomy (2009).   Family History: family history includes Alzheimer's disease in her maternal grandmother; Coronary artery disease in her maternal grandmother; Diabetes in her mother; Hypertension in her father and mother; Lung cancer in her paternal grandfather.   Social History: reports that she has never smoked. She has never used smokeless tobacco. She reports current alcohol use of about 2.0 standard drinks per week. She reports that she does not use drugs.    (Not in a hospital admission)     Allergies: Erythromycin     Review of Systems   Constitutional:  Positive for fatigue.   Respiratory: Negative.     Cardiovascular: Negative.    Gastrointestinal: Negative.      PHYSICAL EXAMINATION:   /80   Pulse 70   Temp 97.5 °F (36.4 °C) (Infrared)   Resp 16   Ht 162.6 cm (64.02\")   Wt 82.6 kg (182 lb)   SpO2 97%   BMI 31.22 kg/m²    Pain Score    09/25/23 1533   PainSc: 0-No pain                     ECOG Performance Status: 1 - Symptomatic but completely ambulatory      General Appearance:      alert, cooperative, no apparent distress and appears " stated age   Lungs:   Clear to auscultation bilaterally; respirations regular, even, and unlabored bilaterally   Heart:  Regular rate and rhythm, no murmurs appreciated   Abdomen:   Soft, non-tender, non-distended, and no organomegaly                 No visits with results within 2 Week(s) from this visit.   Latest known visit with results is:   Lab on 08/21/2023   Component Date Value Ref Range Status    Sed Rate 08/21/2023 11  0 - 20 mm/hr Final    C-Reactive Protein 08/21/2023 1.09 (H)  0.00 - 0.50 mg/dL Final    MYRA Direct 08/21/2023 Negative  Negative Final    Rheumatoid Factor Quantitative 08/21/2023 <10.0  0.0 - 14.0 IU/mL Final    Hepatitis B Surface Ag 08/21/2023 Non-Reactive  Non-Reactive Final    Hep A IgM 08/21/2023 Non-Reactive  Non-Reactive Final    Hep B C IgM 08/21/2023 Non-Reactive  Non-Reactive Final    Hepatitis C Ab 08/21/2023 Non-Reactive  Non-Reactive Final    HIV-1/ HIV-2 08/21/2023 Non-Reactive  Non-Reactive Final    Folate 08/21/2023 >20.00  4.78 - 24.20 ng/mL Final    Vitamin B-12 08/21/2023 701  211 - 946 pg/mL Final    Iron 08/21/2023 55  37 - 145 mcg/dL Final    Iron Saturation (TSAT) 08/21/2023 13 (L)  20 - 50 % Final    Transferrin 08/21/2023 294  200 - 360 mg/dL Final    TIBC 08/21/2023 438  298 - 536 mcg/dL Final    Ferritin 08/21/2023 97.21  13.00 - 150.00 ng/mL Final    WBC 08/21/2023 11.22 (H)  3.40 - 10.80 10*3/mm3 Final    RBC 08/21/2023 4.64  3.77 - 5.28 10*6/mm3 Final    Hemoglobin 08/21/2023 14.3  12.0 - 15.9 g/dL Final    Hematocrit 08/21/2023 42.4  34.0 - 46.6 % Final    MCV 08/21/2023 91.4  79.0 - 97.0 fL Final    MCH 08/21/2023 30.8  26.6 - 33.0 pg Final    MCHC 08/21/2023 33.7  31.5 - 35.7 g/dL Final    RDW 08/21/2023 12.3  12.3 - 15.4 % Final    RDW-SD 08/21/2023 41.9  37.0 - 54.0 fl Final    MPV 08/21/2023 8.8  6.0 - 12.0 fL Final    Platelets 08/21/2023 396  140 - 450 10*3/mm3 Final    Neutrophil % 08/21/2023 47.5  42.7 - 76.0 % Final    Lymphocyte % 08/21/2023 42.2   19.6 - 45.3 % Final    Monocyte % 08/21/2023 4.8 (L)  5.0 - 12.0 % Final    Eosinophil % 08/21/2023 5.0  0.3 - 6.2 % Final    Basophil % 08/21/2023 0.4  0.0 - 1.5 % Final    Immature Grans % 08/21/2023 0.1  0.0 - 0.5 % Final    Neutrophils, Absolute 08/21/2023 5.33  1.70 - 7.00 10*3/mm3 Final    Lymphocytes, Absolute 08/21/2023 4.73 (H)  0.70 - 3.10 10*3/mm3 Final    Monocytes, Absolute 08/21/2023 0.54  0.10 - 0.90 10*3/mm3 Final    Eosinophils, Absolute 08/21/2023 0.56 (H)  0.00 - 0.40 10*3/mm3 Final    Basophils, Absolute 08/21/2023 0.05  0.00 - 0.20 10*3/mm3 Final    Immature Grans, Absolute 08/21/2023 0.01  0.00 - 0.05 10*3/mm3 Final        No results found.    ASSESSMENT: The patient is a very pleasant 38 y.o. female  with lymphocytosis      PLAN:    1.  Lymphocytosis:  A.  I did go over the blood results with the patient from August 21, 2023.  She continued to have mild lymphocytosis with absolute lymphocyte count of 4700.  B.  Her inflammatory infectious and autoimmune work-up came back negative.  C.  She will follow-up with me in 6 months.  CBC with differential    2.  Hypertension:  A.  She will continue HCTZ as well as lisinopril daily    FOLLOW UP: 6 months with a CBC.    Snow Zepeda MD  9/25/2023

## 2023-11-08 DIAGNOSIS — F41.1 GAD (GENERALIZED ANXIETY DISORDER): ICD-10-CM

## 2023-11-08 RX ORDER — BUSPIRONE HYDROCHLORIDE 10 MG/1
10 TABLET ORAL 3 TIMES DAILY PRN
Qty: 270 TABLET | Refills: 0 | Status: SHIPPED | OUTPATIENT
Start: 2023-11-08

## 2023-11-09 RX ORDER — NORGESTIMATE AND ETHINYL ESTRADIOL 7DAYSX3 28
1 KIT ORAL DAILY
Qty: 84 TABLET | Refills: 0 | Status: SHIPPED | OUTPATIENT
Start: 2023-11-09 | End: 2023-11-12 | Stop reason: SDUPTHER

## 2023-11-13 ENCOUNTER — OFFICE VISIT (OUTPATIENT)
Dept: OBSTETRICS AND GYNECOLOGY | Facility: CLINIC | Age: 38
End: 2023-11-13
Payer: COMMERCIAL

## 2023-11-13 VITALS
WEIGHT: 185 LBS | SYSTOLIC BLOOD PRESSURE: 124 MMHG | BODY MASS INDEX: 31.74 KG/M2 | RESPIRATION RATE: 14 BRPM | DIASTOLIC BLOOD PRESSURE: 76 MMHG

## 2023-11-13 DIAGNOSIS — F41.1 GAD (GENERALIZED ANXIETY DISORDER): ICD-10-CM

## 2023-11-13 DIAGNOSIS — Z01.419 WELL WOMAN EXAM WITH ROUTINE GYNECOLOGICAL EXAM: Primary | ICD-10-CM

## 2023-11-13 DIAGNOSIS — Z71.85 VACCINE COUNSELING: ICD-10-CM

## 2023-11-13 PROBLEM — D72.820 LYMPHOCYTOSIS: Status: RESOLVED | Noted: 2023-06-14 | Resolved: 2023-11-13

## 2023-11-13 PROCEDURE — 99395 PREV VISIT EST AGE 18-39: CPT | Performed by: OBSTETRICS & GYNECOLOGY

## 2023-11-13 RX ORDER — NORGESTIMATE AND ETHINYL ESTRADIOL 7DAYSX3 28
1 KIT ORAL DAILY
Qty: 84 TABLET | Refills: 4 | Status: SHIPPED | OUTPATIENT
Start: 2023-11-13

## 2023-11-13 NOTE — PROGRESS NOTES
Subjective   Chief Complaint   Patient presents with    Gynecologic Exam     Shannan Sharp is a 38 y.o. year old  presenting to be seen for her annual exam.     SEXUAL Hx:  She is currently sexually active.  In the past year there there has been NO new sexual partners.    Condoms are never used.  She would not like to be screened for STD's at today's exam.  Current birth control method: OCP (estrogen/progesterone).  She is happy with her current method of contraception and does not want to discuss alternative methods of contraception.  MENSTRUAL Hx:  Patient's last menstrual period was 2023 (approximate).  In the past 6 months her cycles have been regular, predictable and occur monthly.  Her menstrual flow is typically normal.   Each month on average there are roughly 0 day(s) of very heavy flow.  Intermenstrual bleeding is absent.    Post-coital bleeding is absent.  Dysmenorrhea: is not affecting her activities of daily living  PMS: is not affecting her activities of daily living  Her cycles are not a source of concern for her that she wishes to discuss today.  HEALTH Hx:  She exercises regularly: yes.  She wears her seat belt: yes.  She has concerns about domestic violence: no.    The following portions of the patient's history were reviewed and updated as appropriate:problem list, current medications, allergies, past family history, past medical history, past social history, and past surgical history.    Social History    Tobacco Use      Smoking status: Never      Smokeless tobacco: Never    Review of Systems  Constitutional POS: nothing reported    NEG: anorexia or night sweats   Genitourinary POS: nothing reported    NEG: dysuria or hematuria      Gastointestinal POS: nothing reported    NEG: bloating, change in bowel habits, melena, or reflux symptoms   Integument POS: nothing reported    NEG: moles that are changing in size, shape, color or rashes   Breast POS: nothing reported    NEG:  persistent breast lump, skin dimpling, or nipple discharge        Objective   /76   Resp 14   Wt 83.9 kg (185 lb)   LMP 11/01/2023 (Approximate)   BMI 31.74 kg/m²     General:  well developed; well nourished  no acute distress   Skin:  No suspicious lesions seen   Thyroid: normal to inspection and palpation   Breasts:  Examined in supine position  Symmetric without masses or skin dimpling  Nipples normal without inversion, lesions or discharge  There are no palpable axillary nodes   Abdomen: soft, non-tender; no masses  no umbilical or inguinal hernias are present  no hepato-splenomegaly   Pelvis: Clinical staff was present for exam  External genitalia:  normal appearance of the external genitalia including Bartholin's and [place]'s glands.  :  urethral meatus normal;  Vaginal:  normal pink mucosa without prolapse or lesions.  Cervix:  normal appearance.  Uterus:  normal size, shape and consistency.  Adnexa:  normal bimanual exam of the adnexa.  Rectal:  digital rectal exam not performed; anus visually normal appearing.        Assessment   Normal GYN exam  Anxiety followed by primary care and stable on medication       Plan   Pap was done today.  If she does not receive the results of the Pap within 2 weeks  time, she was instructed to call to find out the results.  I explained to Shannan that the recommendations for Pap smear interval in a low risk patient's has lengthened to 3 years time.  I encouraged her to be seen yearly for a full physical exam including breast and pelvic exam even during the off years when PAP's will not be performed.  Her vaccine record was reviewed and updated.  I discussed with Shannan that she may be behind on needed vaccinations for Influenza.  She may be able to obtain these vaccinations at her local pharmacy OR speak about obtaining them with her primary care.  If she does obtain her vaccines, I have asked Shannan to let us know the date each vaccine was obtained so that her  medical record could be updated in our system.  The importance of keeping all planned follow-up and taking all medications as prescribed was emphasized.  Follow up for annual exam 1 year    New Medications Ordered This Visit   Medications    Tri-Sprintec 0.18/0.215/0.25 MG-35 MCG per tablet     Sig: Take 1 tablet by mouth Daily.     Dispense:  84 tablet     Refill:  4          This note was electronically signed.    Sudheer Hamlin M.D.  November 13, 2023    Part of this note may be an electronic transcription/translation of spoken language to printed text using the Dragon Dictation System.

## 2023-11-17 LAB — REF LAB TEST METHOD: NORMAL

## 2023-12-06 ENCOUNTER — OFFICE VISIT (OUTPATIENT)
Dept: INTERNAL MEDICINE | Facility: CLINIC | Age: 38
End: 2023-12-06
Payer: COMMERCIAL

## 2023-12-06 VITALS
HEIGHT: 64 IN | WEIGHT: 187 LBS | BODY MASS INDEX: 31.92 KG/M2 | HEART RATE: 104 BPM | SYSTOLIC BLOOD PRESSURE: 110 MMHG | DIASTOLIC BLOOD PRESSURE: 78 MMHG

## 2023-12-06 DIAGNOSIS — B96.89 ACUTE BACTERIAL RHINOSINUSITIS: Primary | ICD-10-CM

## 2023-12-06 DIAGNOSIS — J01.90 ACUTE BACTERIAL RHINOSINUSITIS: Primary | ICD-10-CM

## 2023-12-06 DIAGNOSIS — Z79.899 HIGH RISK MEDICATION USE: ICD-10-CM

## 2023-12-06 DIAGNOSIS — J45.20 MILD INTERMITTENT ASTHMA WITHOUT COMPLICATION: ICD-10-CM

## 2023-12-06 DIAGNOSIS — F41.1 GAD (GENERALIZED ANXIETY DISORDER): ICD-10-CM

## 2023-12-06 PROCEDURE — 99214 OFFICE O/P EST MOD 30 MIN: CPT | Performed by: NURSE PRACTITIONER

## 2023-12-06 RX ORDER — DOXYCYCLINE HYCLATE 100 MG/1
100 CAPSULE ORAL 2 TIMES DAILY
Qty: 20 CAPSULE | Refills: 0 | Status: SHIPPED | OUTPATIENT
Start: 2023-12-06 | End: 2023-12-16

## 2023-12-06 RX ORDER — DEXTROMETHORPHAN HYDROBROMIDE AND PROMETHAZINE HYDROCHLORIDE 15; 6.25 MG/5ML; MG/5ML
5 SYRUP ORAL 4 TIMES DAILY PRN
Qty: 240 ML | Refills: 0 | Status: SHIPPED | OUTPATIENT
Start: 2023-12-06

## 2023-12-06 RX ORDER — ALPRAZOLAM 0.5 MG/1
0.5 TABLET ORAL 2 TIMES DAILY PRN
Qty: 30 TABLET | Refills: 2 | Status: SHIPPED | OUTPATIENT
Start: 2023-12-06

## 2023-12-06 NOTE — PROGRESS NOTES
Shannan Sahrp  1985  [id number]  Patient Care Team:  Pamella Andujar APRN as PCP - General (Internal Medicine)  Sudheer Hamlin MD as Obstetrician (Obstetrics and Gynecology)  Snow Zepeda MD as Consulting Physician (Hematology and Oncology)    Shannan Sharp is a pleasant 38 y.o. female who presents for evaluation of Sinus Problem (Ongoing since last week with cough, drainage, sore throat, sinus pressure, nasal congestion, and fatigue. Patient has been using cough syruo, sudafed, dayquil, and nightquil. ) and Sinusitis    Chief Complaint   Patient presents with    Sinus Problem     Ongoing since last week with cough, drainage, sore throat, sinus pressure, nasal congestion, and fatigue. Patient has been using cough syruo, sudafed, dayquil, and nightquil.     Sinusitis       HPI:   Shannan is here for acute URI sx including cough, PND, sore throat, bilateral maxillary and frontal sinus pressure for 7-10 days.  Has not had wheezing or needed her Symbicort.  Sx are mostly from the neck up. Using otc sudafed, dayquil and nyquil. Home Covid test neg. Boyfriend with similar sx and also Covid neg.    Also needs refill on alprazolam which she uses very infrequently.  Past Medical History:   Diagnosis Date    Essential hypertension 2019    Mixed hyperlipidemia 2019    SIRS (systemic inflammatory response syndrome) 06/14/2023    Situational anxiety 2022     Past Surgical History:   Procedure Laterality Date    LAPAROSCOPIC CHOLECYSTECTOMY  2009    LASIK Bilateral 07/2023     Family History   Problem Relation Age of Onset    Hypertension Father     Diabetes Mother     Hypertension Mother     Lung cancer Paternal Grandfather     Coronary artery disease Maternal Grandmother     Alzheimer's disease Maternal Grandmother      Social History     Tobacco Use   Smoking Status Never   Smokeless Tobacco Never     Allergies   Allergen Reactions    Erythromycin        Current Outpatient Medications:      "ALPRAZolam (XANAX) 0.5 MG tablet, Take 1 tablet by mouth 2 (Two) Times a Day As Needed for Anxiety., Disp: 30 tablet, Rfl: 2    busPIRone (BUSPAR) 10 MG tablet, Take 1 tablet by mouth 3 (Three) Times a Day As Needed (anxiety)., Disp: 270 tablet, Rfl: 0    FLUoxetine (PROzac) 20 MG capsule, TAKE 1 CAPSULE BY MOUTH  DAILY, Disp: 90 capsule, Rfl: 3    hydroCHLOROthiazide (HYDRODIURIL) 25 MG tablet, TAKE 1 TABLET BY MOUTH  DAILY, Disp: 90 tablet, Rfl: 3    lisinopril (PRINIVIL,ZESTRIL) 20 MG tablet, TAKE 1 TABLET BY MOUTH  DAILY, Disp: 90 tablet, Rfl: 3    Multiple Vitamin (DAILY VITAMIN) tablet, Take 1 tablet by mouth Daily., Disp: , Rfl:     Symbicort 80-4.5 MCG/ACT inhaler, USE 2 INHALATIONS BY MOUTH TWICE DAILY, Disp: 20.4 g, Rfl: 5    Tri-Sprintec 0.18/0.215/0.25 MG-35 MCG per tablet, Take 1 tablet by mouth Daily., Disp: 84 tablet, Rfl: 4    doxycycline (VIBRAMYCIN) 100 MG capsule, Take 1 capsule by mouth 2 (Two) Times a Day for 10 days., Disp: 20 capsule, Rfl: 0    promethazine-dextromethorphan (PROMETHAZINE-DM) 6.25-15 MG/5ML syrup, Take 5 mL by mouth 4 (Four) Times a Day As Needed for Cough., Disp: 240 mL, Rfl: 0    Review of Systems  Review of systems was completed, and pertinent findings are noted in the HPI.  /78 (BP Location: Left arm, Patient Position: Sitting, Cuff Size: Large Adult)   Pulse 104   Ht 162.6 cm (64.02\")   Wt 84.8 kg (187 lb)   LMP 11/01/2023 (Approximate)   BMI 32.08 kg/m²     Physical Exam  Constitutional:       Appearance: She is well-developed. She is obese.   HENT:      Head: Normocephalic and atraumatic.      Nose: Congestion and rhinorrhea present.      Right Sinus: Maxillary sinus tenderness and frontal sinus tenderness present.      Left Sinus: Frontal sinus tenderness present.   Eyes:      Conjunctiva/sclera: Conjunctivae normal.      Pupils: Pupils are equal, round, and reactive to light.   Cardiovascular:      Rate and Rhythm: Normal rate and regular rhythm.      " Pulses: Normal pulses.      Heart sounds: Normal heart sounds.   Pulmonary:      Effort: Pulmonary effort is normal.      Breath sounds: Normal breath sounds. Examination of the right-upper field reveals wheezing.   Musculoskeletal:         General: Normal range of motion.      Cervical back: Normal range of motion and neck supple.   Skin:     General: Skin is warm and dry.   Neurological:      Mental Status: She is alert and oriented to person, place, and time.   Psychiatric:         Mood and Affect: Mood normal.         Behavior: Behavior normal.         Thought Content: Thought content normal.         Judgment: Judgment normal.         PHQ-9 Total Score:      Assessment/Plan:  Diagnoses and all orders for this visit:    1. Acute bacterial rhinosinusitis (Primary)  Comments:  Add mucinex BID and doxy as directed.  Orders:  -     doxycycline (VIBRAMYCIN) 100 MG capsule; Take 1 capsule by mouth 2 (Two) Times a Day for 10 days.  Dispense: 20 capsule; Refill: 0    2. Mild intermittent asthma without complication  Comments:  Use Symbicort 2 puffs BID and up to 12 puffs daily prn for sx of cough/wheezing/sob. RX for promethazine DM for HS cough  Orders:  -     promethazine-dextromethorphan (PROMETHAZINE-DM) 6.25-15 MG/5ML syrup; Take 5 mL by mouth 4 (Four) Times a Day As Needed for Cough.  Dispense: 240 mL; Refill: 0    3. KOJO (generalized anxiety disorder)  Comments:  refill alprazolam, get UDS today  Orders:  -     ALPRAZolam (XANAX) 0.5 MG tablet; Take 1 tablet by mouth 2 (Two) Times a Day As Needed for Anxiety.  Dispense: 30 tablet; Refill: 2    4. High risk medication use  -     Compliance Drug Analysis, Ur - Urine, Clean Catch; Future       There are no Patient Instructions on file for this visit.  Plan of care reviewed with patient at the conclusion of today's visit. Education was provided regarding diagnosis, management and any prescribed or recommended OTC medications.  Patient verbalizes understanding of and  agreement with management plan.    No follow-ups on file.    Dictated Utilizing Dragon Dictation.    Pamella Andujar, APRN

## 2023-12-12 LAB — DRUGS UR: NORMAL

## 2024-03-21 DIAGNOSIS — I10 ESSENTIAL HYPERTENSION: ICD-10-CM

## 2024-03-21 RX ORDER — LISINOPRIL 20 MG/1
20 TABLET ORAL DAILY
Qty: 90 TABLET | Refills: 3 | Status: SHIPPED | OUTPATIENT
Start: 2024-03-21

## 2024-04-14 NOTE — PROGRESS NOTES
Subjective   Chief Complaint   Patient presents with    Follow-up     Shannan Sharp is a 39 y.o. year old  presenting to be seen for a PAP in follow-up of a prior abnormal PAP and/or HPV screen.    The following portions of the patient's history were reviewed and updated as appropriate:current medications and allergies.    Social History    Tobacco Use      Smoking status: Never      Smokeless tobacco: Never    Review of Systems  Constitutional POS: nothing reported    NEG: anorexia or night sweats   Genitourinary POS: nothing reported    NEG: dysuria or hematuria      Gastointestinal POS: nothing reported    NEG: bloating, change in bowel habits, melena, or reflux symptoms   Integument POS: nothing reported    NEG: moles that are changing in size, shape, color or rashes   Breast POS: nothing reported    NEG: persistent breast lump, skin dimpling, or nipple discharge        Objective   Resp 14   Wt 85.3 kg (188 lb)   LMP 2024 (Approximate)   BMI 32.25 kg/m²     General:  well developed; well nourished  no acute distress   Pelvis: Clinical staff was present for exam  External genitalia:  normal appearance of the external genitalia including Bartholin's and [place]'s glands.  :  urethral meatus normal;  Vaginal:  normal pink mucosa without prolapse or lesions.  Cervix:  normal appearance.     Lab Review   No data reviewed    Imaging   No data reviewed       Assessment   ASC-US with (+) pooled HPV and (-) 16/18/45     Plan   Pap was done today.  If she does not receive the results of the Pap within 2 weeks  time, she was instructed to call to find out the results.  I explained to Shannan that because she is being seen in follow-up of a previously abnormal Pap smear, her next Pap needs to be performed in 4-6 months.  She will need to be seen roughly every 6 months until 3 consecutive normal Paps have been obtained.  At that point, she can return to routine screening intervals.  The importance of  keeping all planned follow-up and taking all medications as prescribed was emphasized.  Follow up for repeat PAP smear 6 months           This note was electronically signed.    Sudheer Hamlin M.D.  April 16, 2024    Part of this note may be an electronic transcription/translation of spoken language to printed text using the Dragon Dictation System.

## 2024-04-16 ENCOUNTER — OFFICE VISIT (OUTPATIENT)
Dept: OBSTETRICS AND GYNECOLOGY | Facility: CLINIC | Age: 39
End: 2024-04-16
Payer: COMMERCIAL

## 2024-04-16 VITALS — RESPIRATION RATE: 14 BRPM | WEIGHT: 188 LBS | BODY MASS INDEX: 32.25 KG/M2

## 2024-04-16 DIAGNOSIS — R87.810 ASCUS WITH POSITIVE HIGH RISK HPV CERVICAL: Primary | ICD-10-CM

## 2024-04-16 DIAGNOSIS — R87.610 ASCUS WITH POSITIVE HIGH RISK HPV CERVICAL: Primary | ICD-10-CM

## 2024-04-16 PROCEDURE — 99212 OFFICE O/P EST SF 10 MIN: CPT | Performed by: OBSTETRICS & GYNECOLOGY

## 2024-04-23 RX ORDER — SCOLOPAMINE TRANSDERMAL SYSTEM 1 MG/1
1 PATCH, EXTENDED RELEASE TRANSDERMAL
Qty: 5 PATCH | Refills: 1 | Status: SHIPPED | OUTPATIENT
Start: 2024-04-23

## 2024-04-24 LAB — REF LAB TEST METHOD: NORMAL

## 2024-07-24 ENCOUNTER — OFFICE VISIT (OUTPATIENT)
Dept: INTERNAL MEDICINE | Facility: CLINIC | Age: 39
End: 2024-07-24
Payer: COMMERCIAL

## 2024-07-24 VITALS
SYSTOLIC BLOOD PRESSURE: 112 MMHG | BODY MASS INDEX: 29.19 KG/M2 | WEIGHT: 171 LBS | HEART RATE: 88 BPM | HEIGHT: 64 IN | TEMPERATURE: 97.7 F | DIASTOLIC BLOOD PRESSURE: 78 MMHG

## 2024-07-24 DIAGNOSIS — E66.3 OVERWEIGHT: Primary | ICD-10-CM

## 2024-07-24 DIAGNOSIS — F41.8 SITUATIONAL ANXIETY: ICD-10-CM

## 2024-07-24 DIAGNOSIS — F41.1 GAD (GENERALIZED ANXIETY DISORDER): ICD-10-CM

## 2024-07-24 DIAGNOSIS — J45.20 INTERMITTENT ASTHMA WITHOUT COMPLICATION, UNSPECIFIED ASTHMA SEVERITY: ICD-10-CM

## 2024-07-24 DIAGNOSIS — E55.9 VITAMIN D DEFICIENCY: ICD-10-CM

## 2024-07-24 DIAGNOSIS — I10 ESSENTIAL HYPERTENSION: ICD-10-CM

## 2024-07-24 PROCEDURE — 90715 TDAP VACCINE 7 YRS/> IM: CPT | Performed by: NURSE PRACTITIONER

## 2024-07-24 PROCEDURE — 90471 IMMUNIZATION ADMIN: CPT | Performed by: NURSE PRACTITIONER

## 2024-07-24 PROCEDURE — 99395 PREV VISIT EST AGE 18-39: CPT | Performed by: NURSE PRACTITIONER

## 2024-07-24 RX ORDER — HYDROCHLOROTHIAZIDE 25 MG/1
25 TABLET ORAL DAILY
Qty: 90 TABLET | Refills: 3 | Status: SHIPPED | OUTPATIENT
Start: 2024-07-24

## 2024-07-24 RX ORDER — BUSPIRONE HYDROCHLORIDE 10 MG/1
10 TABLET ORAL 3 TIMES DAILY PRN
Qty: 270 TABLET | Refills: 1 | Status: SHIPPED | OUTPATIENT
Start: 2024-07-24

## 2024-07-24 RX ORDER — BUDESONIDE AND FORMOTEROL FUMARATE DIHYDRATE 80; 4.5 UG/1; UG/1
2 AEROSOL RESPIRATORY (INHALATION)
Qty: 3 EACH | Refills: 1 | Status: SHIPPED | OUTPATIENT
Start: 2024-07-24

## 2024-07-24 RX ORDER — ALBUTEROL SULFATE 90 UG/1
2 AEROSOL, METERED RESPIRATORY (INHALATION) EVERY 4 HOURS PRN
Qty: 18 G | Refills: 5 | Status: SHIPPED | OUTPATIENT
Start: 2024-07-24

## 2024-07-24 RX ORDER — FLUOXETINE HYDROCHLORIDE 20 MG/1
20 CAPSULE ORAL DAILY
Qty: 90 CAPSULE | Refills: 3 | Status: SHIPPED | OUTPATIENT
Start: 2024-07-24

## 2024-07-24 RX ORDER — ONDANSETRON 4 MG/1
4 TABLET, FILM COATED ORAL EVERY 8 HOURS PRN
Qty: 15 TABLET | Refills: 0 | Status: SHIPPED | OUTPATIENT
Start: 2024-07-24

## 2024-07-24 RX ORDER — ALBUTEROL SULFATE 90 UG/1
2 AEROSOL, METERED RESPIRATORY (INHALATION) EVERY 4 HOURS PRN
COMMUNITY
End: 2024-07-24 | Stop reason: SDUPTHER

## 2024-07-24 NOTE — PROGRESS NOTES
Shannan Sharp  1985  [id number]  Patient Care Team:  Pamella Andujar APRN as PCP - General (Internal Medicine)  Sudheer Hamlin MD as Obstetrician (Obstetrics and Gynecology)  Snow Zepeda MD as Consulting Physician (Hematology and Oncology)    Shannan Sharp is a 39 y.o. pleasant female here today for annual physical.  Chief Complaint   Patient presents with    Annual Exam    Nausea     Related to ibuprofen use        HPI:   History of Present Illness  Shannan is a 39-year-old female here for a physical. She has a pertinent medical history including hypertension and anxiety.     The patient continues to consult with Dr. Méndez for gyn and is currently on Tri-Sprintec. She does not regularly monitor her blood pressure at home any more. Her current medication regimen includes hydrochlorothiazide 25 mg and lisinopril 20 mg. She maintains an active lifestyle, engaging in Orangetheory, Pilates, cycle bar, and Stretch Lab until a few weeks ago due to an ankle injury. She initiated semaglutide therapy at the end of 03/2023, resulting in a weight loss of approximately 20 pounds. She is paying out of pocket since her insurance did not cover. Her dietary habits include limited fried and fast foods, occasional soda, and reduced caffeine intake. Her target weight is between 150 and 160 pounds. She underwent oral surgery last Friday, which resulted in a dry socket. She was prescribed hydrocodone, which she took daily. Currently, she is managing her pain with Extra Strength Tylenol and ibuprofen. She reports mild nausea, which she attributes to her pain medication.    She expresses a desire to lose weight to discontinue her blood pressure medication. She uses Symbicort prior to high-interval workouts or Orangetheory, and otherwise uses it during allergy season.    She denies any reflux, constipation, or diarrhea. She reports occasional stomach discomfort for the past week, which she attributes  to her pain medication.        Past Medical History:   Diagnosis Date    Essential hypertension 2019    Mixed hyperlipidemia 2019    SIRS (systemic inflammatory response syndrome) 06/14/2023    Situational anxiety 2022    Urinary tract infection June 2023     Past Surgical History:   Procedure Laterality Date    LAPAROSCOPIC CHOLECYSTECTOMY  2009    LASIK Bilateral 07/2023     Family History   Problem Relation Age of Onset    Hypertension Father     Diabetes Mother     Hypertension Mother     Lung cancer Paternal Grandfather     Coronary artery disease Maternal Grandmother     Alzheimer's disease Maternal Grandmother     Diabetes Maternal Grandmother      Social History     Tobacco Use   Smoking Status Never   Smokeless Tobacco Never     Allergies   Allergen Reactions    Erythromycin        Current Outpatient Medications:     albuterol sulfate  (90 Base) MCG/ACT inhaler, Inhale 2 puffs Every 4 (Four) Hours As Needed for Wheezing. Indications: Exercise-Induced Bronchospastic Disease, Disp: 18 g, Rfl: 5    ALPRAZolam (XANAX) 0.5 MG tablet, Take 1 tablet by mouth 2 (Two) Times a Day As Needed for Anxiety., Disp: 30 tablet, Rfl: 2    busPIRone (BUSPAR) 10 MG tablet, Take 1 tablet by mouth 3 (Three) Times a Day As Needed (anxiety)., Disp: 270 tablet, Rfl: 1    FLUoxetine (PROzac) 20 MG capsule, Take 1 capsule by mouth Daily., Disp: 90 capsule, Rfl: 3    hydroCHLOROthiazide 25 MG tablet, Take 1 tablet by mouth Daily., Disp: 90 tablet, Rfl: 3    lisinopril (PRINIVIL,ZESTRIL) 20 MG tablet, TAKE 1 TABLET BY MOUTH DAILY, Disp: 90 tablet, Rfl: 3    Multiple Vitamin (DAILY VITAMIN) tablet, Take 1 tablet by mouth Daily., Disp: , Rfl:     SEMAGLUTIDE PO, Take 40-45 Units by mouth 1 (One) Time Per Week. From [facility], Disp: , Rfl:     Tri-Sprintec 0.18/0.215/0.25 MG-35 MCG per tablet, Take 1 tablet by mouth Daily., Disp: 84 tablet, Rfl: 4    budesonide-formoterol (Symbicort) 80-4.5  "MCG/ACT inhaler, Inhale 2 puffs 2 (Two) Times a Day., Disp: 3 each, Rfl: 1    ondansetron (Zofran) 4 MG tablet, Take 1 tablet by mouth Every 8 (Eight) Hours As Needed for Nausea or Vomiting., Disp: 15 tablet, Rfl: 0    Review of Systems    Review of systems was completed, and pertinent findings are noted in the HPI.    /78 (BP Location: Left arm, Patient Position: Sitting, Cuff Size: Adult)   Pulse 88   Temp 97.7 °F (36.5 °C) (Temporal)   Ht 162.6 cm (64\")   Wt 77.6 kg (171 lb)   BMI 29.35 kg/m²     Physical Exam  Vitals reviewed.   Constitutional:       Appearance: Normal appearance. She is well-developed and overweight.   HENT:      Head: Normocephalic.      Right Ear: External ear normal.      Left Ear: External ear normal.   Eyes:      Conjunctiva/sclera: Conjunctivae normal.      Pupils: Pupils are equal, round, and reactive to light.   Cardiovascular:      Rate and Rhythm: Normal rate and regular rhythm.      Pulses: Normal pulses.      Heart sounds: Normal heart sounds.   Pulmonary:      Effort: Pulmonary effort is normal.      Breath sounds: Normal breath sounds.   Abdominal:      General: Abdomen is flat. Bowel sounds are normal.      Palpations: Abdomen is soft.   Musculoskeletal:         General: Normal range of motion.      Cervical back: Normal range of motion and neck supple.   Skin:     General: Skin is warm and dry.   Neurological:      Mental Status: She is alert and oriented to person, place, and time.   Psychiatric:         Mood and Affect: Mood normal.         Behavior: Behavior normal.         Thought Content: Thought content normal.         Judgment: Judgment normal.       Physical Exam  Vital Signs  Vitals show a blood pressure of 112/78.    Results      PHQ-9 Total Score: 0         Assessment/Plan:  Assessment & Plan  1. Annual physical.  Laboratory tests have been ordered, including a vitamin D level check. A tetanus vaccine will be administered today.    2. HTN  The patient's " blood pressure is well-regulated. Continue lisinopril 20mg and hctz 25mg daily for now and track BP at least weekly. Continue to work on low sodium diet. Additional weight loss may help lower BP. If bp low and/or dizziness/lightheadedness occurs will consider decreasing hctz to 1/2 tablet.     3. Nausea  Likely due to recent dental work and/or pain medication. A prescription for Zofran has been issued, to be taken every 4 to 6 hours as needed for nausea.     4. EIB  Use albuterol prior to work outs and Symbicort for seasonal intermittent asthma symptoms.     5. Overweight  Ok to continue on semaglutide for weight loss/maintenance.    Patient Instructions   Preventive Care 21-39 Years Old, Female  Preventive care refers to lifestyle choices and visits with your health care provider that can promote health and wellness. Preventive care visits are also called wellness exams.  What can I expect for my preventive care visit?  Counseling  During your preventive care visit, your health care provider may ask about your:  Medical history, including:  Past medical problems.  Family medical history.  Pregnancy history.  Current health, including:  Menstrual cycle.  Method of birth control.  Emotional well-being.  Home life and relationship well-being.  Sexual activity and sexual health.  Lifestyle, including:  Alcohol, nicotine or tobacco, and drug use.  Access to firearms.  Diet, exercise, and sleep habits.  Work and work environment.  Sunscreen use.  Safety issues such as seatbelt and bike helmet use.  Physical exam  Your health care provider may check your:  Height and weight. These may be used to calculate your BMI (body mass index). BMI is a measurement that tells if you are at a healthy weight.  Waist circumference. This measures the distance around your waistline. This measurement also tells if you are at a healthy weight and may help predict your risk of certain diseases, such as type 2 diabetes and high blood  pressure.  Heart rate and blood pressure.  Body temperature.  Skin for abnormal spots.  What immunizations do I need?    Vaccines are usually given at various ages, according to a schedule. Your health care provider will recommend vaccines for you based on your age, medical history, and lifestyle or other factors, such as travel or where you work.  What tests do I need?  Screening  Your health care provider may recommend screening tests for certain conditions. This may include:  Pelvic exam and Pap test.  Lipid and cholesterol levels.  Diabetes screening. This is done by checking your blood sugar (glucose) after you have not eaten for a while (fasting).  Hepatitis B test.  Hepatitis C test.  HIV (human immunodeficiency virus) test.  STI (sexually transmitted infection) testing, if you are at risk.  BRCA-related cancer screening. This may be done if you have a family history of breast, ovarian, tubal, or peritoneal cancers.  Talk with your health care provider about your test results, treatment options, and if necessary, the need for more tests.  Follow these instructions at home:  Eating and drinking    Eat a healthy diet that includes fresh fruits and vegetables, whole grains, lean protein, and low-fat dairy products.  Take vitamin and mineral supplements as recommended by your health care provider.  Do not drink alcohol if:  Your health care provider tells you not to drink.  You are pregnant, may be pregnant, or are planning to become pregnant.  If you drink alcohol:  Limit how much you have to 0-1 drink a day.  Know how much alcohol is in your drink. In the [place] one drink equals one 12 oz bottle of beer (355 mL), one 5 oz glass of wine (148 mL), or one 1½ oz glass of hard liquor (44 mL).  Lifestyle  Brush your teeth every morning and night with fluoride toothpaste. Floss one time each day.  Exercise for at least 30 minutes 5 or more days each week.  Do not use any products that contain nicotine or tobacco. These  products include cigarettes, chewing tobacco, and vaping devices, such as e-cigarettes. If you need help quitting, ask your health care provider.  Do not use drugs.  If you are sexually active, practice safe sex. Use a condom or other form of protection to prevent STIs.  If you do not wish to become pregnant, use a form of birth control. If you plan to become pregnant, see your health care provider for a prepregnancy visit.  Find healthy ways to manage stress, such as:  Meditation, yoga, or listening to music.  Journaling.  Talking to a trusted person.  Spending time with friends and family.  Minimize exposure to UV radiation to reduce your risk of skin cancer.  Safety  Always wear your seat belt while driving or riding in a vehicle.  Do not drive:  If you have been drinking alcohol. Do not ride with someone who has been drinking.  If you have been using any mind-altering substances or drugs.  While texting.  When you are tired or distracted.  Wear a helmet and other protective equipment during sports activities.  If you have firearms in your house, make sure you follow all gun safety procedures.  Seek help if you have been physically or sexually abused.  What's next?  Go to your health care provider once a year for an annual wellness visit.  Ask your health care provider how often you should have your eyes and teeth checked.  Stay up to date on all vaccines.  This information is not intended to replace advice given to you by your health care provider. Make sure you discuss any questions you have with your health care provider.  Document Revised: 06/15/2022 Document Reviewed: 06/15/2022  [employer] Patient Education © 2024 [employer]      Counseling/Anticipatory Guidance:   Plan of care reviewed with patient at the conclusion of today's visit. Education was provided in regards to diagnosis, diet and exercise, cervical cancer screening, self breast exams, breast cancer screening, and the importance of yearly  mammograms.   Nutrition, family planning/contraception, physical activity, healthy weight,ways to reduce stress, adequate sleep, injury prevention, misuse of tobacco, alcohol and drugs, sexual behavior and STD's, dental health, mental health, and immunizations.    Management and any prescribed or recommended OTC medications.  Patient verbalizes understanding of and agreement with management plan.    No follow-ups on file.    Dictated Utilizing Dragon Dictation.      HUMZA Diaz  Patient or patient representative verbalized consent for the use of Ambient Listening during the visit with  HUMZA Diaz for chart documentation. 7/28/2024  16:17 EDT

## 2024-07-24 NOTE — LETTER
[facility]  Vaccine Consent Form    Patient Name:  Shannan Sharp  Patient :  1985  MRN: [id number]     Vaccine(s) Ordered    Tdap Vaccine Greater Than or Equal To 8yo IM        Screening Checklist  The following questions should be completed prior to vaccination. If you answer “yes” to any question, it does not necessarily mean you should not be vaccinated. It just means we may need to clarify or ask more questions. If a question is unclear, please ask your healthcare provider to explain it.    Yes No   Any fever or moderate to severe illness today (mild illness and/or antibiotic treatment are not contraindications)?     Do you have a history of a serious reaction to any previous vaccinations, such as anaphylaxis, encephalopathy within 7 days, Guillain-Naches syndrome within 6 weeks, seizure?     Have you received any live vaccine(s) (e.g MMR, DONTA) or any other vaccines in the last month (to ensure duplicate doses aren't given)?     Do you have an anaphylactic allergy to latex (DTaP, DTaP-IPV, Hep A, Hep B, MenB, RV, Td, Tdap), baker’s yeast (Hep B, HPV), polysorbates (RSV, nirsevimab, PCV 20, Rotavirrus, Tdap, Shingrix), or gelatin (DONTA, MMR)?     Do you have an anaphylactic allergy to neomycin (Rabies, DONTA, MMR, IPV, Hep A), polymyxin B (IPV), or streptomycin (IPV)?      Any cancer, leukemia, AIDS, or other immune system disorder? (DONTA, MMR, RV)     Do you have a parent, brother, or sister with an immune system problem (if immune competence of vaccine recipient clinically verified, can proceed)? (MMR, DONTA)     Any recent steroid treatments for >2 weeks, chemotherapy, or radiation treatment? (DONTA, MMR)     Have you received antibody-containing blood transfusions or IVIG in the past 11 months (recommended interval is dependent on product)? (MMR, DONTA)     Have you taken antiviral drugs (acyclovir, famciclovir, valacyclovir for DONTA) in the last 24 or 48 hours, respectively?      Are you pregnant or  "planning to become pregnant within 1 month? (DONTA, MMR, HPV, IPV, MenB, Abrexvy; For Hep B- refer to Engerix-B; For RSV - Abrysvo is indicated for 32-36 weeks of pregnancy from September to January)     For infants, have you ever been told your child has had intussusception or a medical emergency involving obstruction of the intestine (Rotavirus)? If not for an infant, can skip this question.         *Ordering Physicians/APC should be consulted if \"yes\" is checked by the patient or guardian above.  I have received, read, and understand the Vaccine Information Statement (VIS) for each vaccine ordered.  I have considered my or my child's health status as well as the health status of my close contacts.  I have taken the opportunity to discuss my vaccine questions with my or my child's health care provider.   I have requested that the ordered vaccine(s) be given to me or my child.  I understand the benefits and risks of the vaccines.  I understand that I should remain in the clinic for 15 minutes after receiving the vaccine(s).  _________________________________________________________  Signature of Patient or Parent/Legal Guardian ____________________  Date     "

## 2024-10-14 NOTE — PROGRESS NOTES
Subjective   Chief Complaint   Patient presents with    Follow-up     Shannan Sharp is a 39 y.o. year old  presenting to be seen for a PAP in follow-up of a prior abnormal PAP and/or HPV screen.  She has stopped her birth control pills in anticipation of trying to get pregnant.  She does take a multivitamin that she believes has folic acid in it.    The following portions of the patient's history were reviewed and updated as appropriate:current medications and allergies.    Social History    Tobacco Use      Smoking status: Never      Smokeless tobacco: Never    Review of Systems  Constitutional POS: nothing reported    NEG: anorexia or night sweats   Genitourinary POS: nothing reported    NEG: dysuria or hematuria      Gastointestinal POS: nothing reported    NEG: bloating, change in bowel habits, melena, or reflux symptoms   Integument POS: nothing reported    NEG: moles that are changing in size, shape, color or rashes   Breast POS: nothing reported    NEG: persistent breast lump, skin dimpling, or nipple discharge        Objective   Wt 76.2 kg (168 lb)   LMP 2024 (Approximate)   BMI 28.84 kg/m²     General:  well developed; well nourished  no acute distress  mentation appropriate   Pelvis: Clinical staff was present for exam  External genitalia:  normal appearance of the external genitalia including Bartholin's and [place]'s glands.  :  urethral meatus normal;  Vaginal:  normal pink mucosa without prolapse or lesions.  Cervix:  normal appearance.     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  This should be changed to a beta-blocker such as labetalol or calcium channel blocker such as Procardia in anticipation of conception.  In addition if she is able to get off the diuretic that would be  prudent but not mandatory.  Also would be off of the semaglutide with a positive pregnancy test.     Plan   Pap was done today.  If she does not receive the results of the Pap within 2 weeks  time, she was instructed to call to find out the results.  I explained to Shannan that because she is being seen in follow-up of a previously abnormal Pap smear, her next Pap needs to be performed in 4-6 months.  She will need to be seen roughly every 6 months until 3 consecutive normal Paps have been obtained.  At that point, she can return to routine screening intervals.  Folic acid for the prevention of neural tube defects was discussed.  At least 0.4 mg should be taken preconceptionally to reduce the risk.  It was explained that this may reduce the baseline incidence of neural tube defect by as much as 65%.  Folic acid can be found in OTC multivitamins, OTC prenatal vitamins or breakfast cereal that that contains 100% of the RDA of folate.  The importance of keeping all planned follow-up and taking all medications as prescribed was emphasized.  Follow up for annual exam 6 months           This note was electronically signed.    Sudheer Hamlin M.D.  October 15, 2024    Part of this note may be an electronic transcription/translation of spoken language to printed text using the Dragon Dictation System.

## 2024-10-15 ENCOUNTER — OFFICE VISIT (OUTPATIENT)
Dept: OBSTETRICS AND GYNECOLOGY | Facility: CLINIC | Age: 39
End: 2024-10-15
Payer: COMMERCIAL

## 2024-10-15 VITALS — WEIGHT: 168 LBS | BODY MASS INDEX: 28.84 KG/M2

## 2024-10-15 DIAGNOSIS — R87.810 ASCUS WITH POSITIVE HIGH RISK HPV CERVICAL: Primary | ICD-10-CM

## 2024-10-15 DIAGNOSIS — Z01.42 PAP SMEAR TO CONFIRM RECENT NORMAL SMEAR FOLLOWING INITIAL ABNORMAL SMEAR: ICD-10-CM

## 2024-10-15 DIAGNOSIS — R87.610 ASCUS WITH POSITIVE HIGH RISK HPV CERVICAL: Primary | ICD-10-CM

## 2024-10-15 DIAGNOSIS — Z31.69 ENCOUNTER FOR PRECONCEPTION CONSULTATION: ICD-10-CM

## 2024-10-15 NOTE — LETTER
October 15, 2024     HUMZA Diaz   [facility] [address]  [facility] [address]    Patient: Shannan Sharp   YOB: 1985   Date of Visit: 10/15/2024       Dear HUMZA Diaz    Shannan Sharp was in my office today. Below is a copy of my note.    If you have questions, please do not hesitate to call me. I look forward to following Shannan along with you.         Sincerely,        Sudheer Hamlin MD        CC: No Recipients    Subjective  Chief Complaint   Patient presents with   • Follow-up     Shannan Sharp is a 39 y.o. year old  presenting to be seen for a PAP in follow-up of a prior abnormal PAP and/or HPV screen.  She has stopped her birth control pills in anticipation of trying to get pregnant.  She does take a multivitamin that she believes has folic acid in it.    The following portions of the patient's history were reviewed and updated as appropriate:current medications and allergies.    Social History    Tobacco Use      Smoking status: Never      Smokeless tobacco: Never    Review of Systems  Constitutional POS: nothing reported    NEG: anorexia or night sweats   Genitourinary POS: nothing reported    NEG: dysuria or hematuria      Gastointestinal POS: nothing reported    NEG: bloating, change in bowel habits, melena, or reflux symptoms   Integument POS: nothing reported    NEG: moles that are changing in size, shape, color or rashes   Breast POS: nothing reported    NEG: persistent breast lump, skin dimpling, or nipple discharge        Objective  Wt 76.2 kg (168 lb)   LMP 2024 (Approximate)   BMI 28.84 kg/m²     General:  well developed; well nourished  no acute distress  mentation appropriate   Pelvis: Clinical staff was present for exam  External genitalia:  normal appearance of the external genitalia including Bartholin's and [place]'s glands.  :  urethral meatus normal;  Vaginal:  normal pink mucosa without prolapse or  lesions.  Cervix:  normal appearance.     Lab Review   No data reviewed    Imaging   No data reviewed       Assessment  ASC-US with (+) pooled HPV and (-) 16/18/45  Pre-conceptional - she currently is taking sufficient folic acid  Chronic hypertension currently on an ACE inhibitor.  This should be changed to a beta-blocker such as labetalol or calcium channel blocker such as Procardia in anticipation of conception.  In addition if she is able to get off the diuretic that would be prudent but not mandatory.  Also would be off of the semaglutide with a positive pregnancy test.     Plan  Pap was done today.  If she does not receive the results of the Pap within 2 weeks  time, she was instructed to call to find out the results.  I explained to Shannan that because she is being seen in follow-up of a previously abnormal Pap smear, her next Pap needs to be performed in 4-6 months.  She will need to be seen roughly every 6 months until 3 consecutive normal Paps have been obtained.  At that point, she can return to routine screening intervals.  Folic acid for the prevention of neural tube defects was discussed.  At least 0.4 mg should be taken preconceptionally to reduce the risk.  It was explained that this may reduce the baseline incidence of neural tube defect by as much as 65%.  Folic acid can be found in OTC multivitamins, OTC prenatal vitamins or breakfast cereal that that contains 100% of the RDA of folate.  The importance of keeping all planned follow-up and taking all medications as prescribed was emphasized.  Follow up for annual exam 6 months           This note was electronically signed.    Sudheer Hamlin M.D.  October 15, 2024    Part of this note may be an electronic transcription/translation of spoken language to printed text using the Dragon Dictation System.

## 2024-10-15 NOTE — PATIENT INSTRUCTIONS
Preventing Birth Defects with Folic Acid  What is folic acid?  Folic acid is a vitamin that is used by the body to create new cells and keep the blood healthy. Everyone needs folic acid to stay healthy. It is especially important if you are pregnant.  Folic acid is artificial (synthetic). The vitamin in its natural form is called folate. Some foods are natural sources of folate, and other foods have folic acid added to them (fortified foods). You can also buy folic acid supplements or vitamins that contain folic acid.  Why is folic acid important during pregnancy?  Folic acid helps reduce your baby's risk of serious birth defects, especially the class of birth defects called neural tube defects (NTD).  Types of NTD's include:  Spina bifida. Spina bifida occurs when a baby's spinal column does not develop completely. This can cause serious, long-term (chronic) disabilities.  Anencephaly. This is a birth defect that causes your baby to be missing parts of the brain, scalp, and skull when he or she is born. Most babies born with anencephaly only live for a short amount of time.  How much folic acid do I need?  If you plan to become pregnant, you should take at least 400 mcg (micrograms) of folic acid daily. Birth defects usually occur in the earliest stages of pregnancy, often before you know you are pregnant. Taking folic acid when you start trying to become pregnant can help prevent birth defects.  While you are pregnant, you need at least 600 mcg of folic acid each day.  If you have had a child with a NTD, you may be recommended you take at least 5,000 mcg of folic acid daily.  If you take certain medications (certain medications for control of seizures) you made need more the 400 mcg.  What nutrition changes can be made?  To increase your intake of folate and folic acid, you may:  Eat more foods that are fortified with folic acid. Check food labels to see whether a food contains folic acid. Foods that are commonly  fortified with folic acid include:  Cereals.  Pastas.  Flours.  White rice.  Breads.  Eat more foods that are natural sources of folate, such as:  Legumes, including lentils, peas, and beans.  Nuts.  Vegetables, especially dark green leafy vegetables, such as spinach and mustard greens.  Citrus fruits and juices.  Your health care provider may still recommend that you take a supplement to make sure that you get enough to reduce the risk of birth defects.  Where to find support  Talk with your health care provider or your pharmacist to find a folic acid supplement that is right for you.  Your health care provider may recommend that you see a nutrition specialist (nutritionist). A nutritionist can help you make healthy food choices and get more folate from your diet.  Nutrition education programs may be available through your community health department.  Where to find more information  Visit the following websites to learn more about the importance of folic acid.  Centers for Disease Control and Prevention: www.cdc.gov/ncbddd/folicacid/about.html  The Office on Women's Health: womenshTalentologyth.gov/publications/our-publications/fact-sheet/folic-acid.html  National Institutes of Health: ods.od.nih.gov/factsheets/Folate-Consumer  The March of Dimes: www.marchofdimes.org/pregnancy/folic-acid.aspx  Summary  It is important to start taking folic acid when you are planning to become pregnant, because many birth defects can happen before you know that you are pregnant.  You can get more folate and folic acid from your diet. Look for certain foods that are fortified with folic acid.  Your health care provider may recommend that you take a supplement to get enough folic acid.    This information is not intended to replace advice given to you by your health care provider. Make sure you discuss any questions you have with your health care provider.  Document Released: 01/18/2017 Document Revised: 11/30/2018 Document Reviewed:  01/18/2017  Elsevier Patient Education © 2020 [employer]vier Inc.

## 2024-10-17 LAB — REF LAB TEST METHOD: NORMAL

## 2024-11-18 ENCOUNTER — OFFICE VISIT (OUTPATIENT)
Dept: INTERNAL MEDICINE | Facility: CLINIC | Age: 39
End: 2024-11-18
Payer: COMMERCIAL

## 2024-11-18 ENCOUNTER — E-VISIT (OUTPATIENT)
Dept: FAMILY MEDICINE CLINIC | Facility: TELEHEALTH | Age: 39
End: 2024-11-18
Payer: COMMERCIAL

## 2024-11-18 VITALS
OXYGEN SATURATION: 96 % | HEIGHT: 64 IN | BODY MASS INDEX: 28.1 KG/M2 | HEART RATE: 98 BPM | SYSTOLIC BLOOD PRESSURE: 118 MMHG | DIASTOLIC BLOOD PRESSURE: 92 MMHG | TEMPERATURE: 100.7 F | WEIGHT: 164.6 LBS

## 2024-11-18 DIAGNOSIS — J40 BRONCHITIS: ICD-10-CM

## 2024-11-18 DIAGNOSIS — R05.1 ACUTE COUGH: Primary | ICD-10-CM

## 2024-11-18 PROCEDURE — 87428 SARSCOV & INF VIR A&B AG IA: CPT | Performed by: STUDENT IN AN ORGANIZED HEALTH CARE EDUCATION/TRAINING PROGRAM

## 2024-11-18 PROCEDURE — 99213 OFFICE O/P EST LOW 20 MIN: CPT | Performed by: STUDENT IN AN ORGANIZED HEALTH CARE EDUCATION/TRAINING PROGRAM

## 2024-11-18 RX ORDER — DEXTROMETHORPHAN HYDROBROMIDE AND PROMETHAZINE HYDROCHLORIDE 15; 6.25 MG/5ML; MG/5ML
5 SYRUP ORAL 4 TIMES DAILY PRN
Qty: 180 ML | Refills: 0 | Status: SHIPPED | OUTPATIENT
Start: 2024-11-18

## 2024-11-18 RX ORDER — FLUTICASONE PROPIONATE 50 MCG
2 SPRAY, SUSPENSION (ML) NASAL DAILY
Qty: 18.2 G | Refills: 0 | Status: SHIPPED | OUTPATIENT
Start: 2024-11-18

## 2024-11-18 NOTE — PROGRESS NOTES
Office Note     Name: Shannan Sharp    : 1985     MRN: [id number]     Chief Complaint  Sore Throat, Cough, Muscle Pain, Nasal Congestion, Fever, and Nausea    Subjective     History of Present Illness:  Shannan Sharp is a 39 y.o. female who presents today for evaluation of sore throat, cough, myalgias, congestion, fever, nausea.  She has a fever of 100.7 today.      History of Present Illness  The patient is a 39-year-old female presenting for evaluation of chest congestion, myalgias, and fever.    She began experiencing deep chest pain on Saturday, which has progressively worsened. This morning, she woke up with muscle aches in her chest and back, and has been coughing up phlegm. She also reports wheezing and a sore throat, but no lymph node swelling. She has postnasal drip and had a runny nose last week. She typically experiences a respiratory infection annually around this time.    She has been using an albuterol inhaler once daily for shortness of breath, but it does not seem to help with her current symptoms. She has also been taking vitamin supplements. She took DayQuil yesterday and her usual medications today, along with two extra Tylenol about an hour and a half ago, which alleviated her back pain. She took Mucinex DM yesterday and this morning.    She spent the entire day at home yesterday due to feeling unwell. Upon waking today, she felt significantly worse. She reports no known exposure to sick individuals. She has been experiencing chills and sweats.    ALLERGIES  She is allergic to ERYTHROMYCIN.    Review of Systems:   Review of Systems   Constitutional:  Positive for chills, diaphoresis and fever.   HENT:  Positive for congestion, postnasal drip, rhinorrhea, sinus pressure and sore throat.    Respiratory:  Positive for cough. Negative for shortness of breath and wheezing.    All other systems reviewed and are negative.      Past Medical History:   Past Medical History:    Diagnosis Date    Essential hypertension 2019    Mixed hyperlipidemia 2019    SIRS (systemic inflammatory response syndrome) 06/14/2023    Situational anxiety 2022    Urinary tract infection June 2023       Past Surgical History:   Past Surgical History:   Procedure Laterality Date    LAPAROSCOPIC CHOLECYSTECTOMY  2009    LASIK Bilateral 07/2023       Family History:   Family History   Problem Relation Age of Onset    Hypertension Father     Diabetes Mother     Hypertension Mother     Lung cancer Paternal Grandfather     Coronary artery disease Maternal Grandmother     Alzheimer's disease Maternal Grandmother     Diabetes Maternal Grandmother        Social History:   Social History     Socioeconomic History    Marital status:    Tobacco Use    Smoking status: Never    Smokeless tobacco: Never   Vaping Use    Vaping status: Never Used   Substance and Sexual Activity    Alcohol use: Yes     Alcohol/week: 1.0 standard drink of alcohol     Types: 1 Drinks containing 0.5 oz of alcohol per week    Drug use: No    Sexual activity: Yes     Partners: Male     Birth control/protection: Birth control pill       Immunizations:   Immunization History   Administered Date(s) Administered    COVID-19 (MODERNA) 1st,2nd,3rd Dose Monovalent 02/13/2021, 03/13/2021    COVID-19 (MODERNA) Monovalent Original Booster 11/29/2021    Fluzone (or Fluarix & Flulaval for VFC) >6mos 10/30/2020, 10/10/2022    Fluzone Quad >6mos (Multi-dose) 10/30/2015    Influenza TIV (IM) 09/20/2009, 10/15/2011    Influenza, Unspecified 10/28/2018, 10/30/2020, 10/10/2022    Tdap 02/26/2014, 07/24/2024        Medications:     Current Outpatient Medications:     albuterol sulfate  (90 Base) MCG/ACT inhaler, Inhale 2 puffs Every 4 (Four) Hours As Needed for Wheezing. Indications: Exercise-Induced Bronchospastic Disease, Disp: 18 g, Rfl: 5    ALPRAZolam (XANAX) 0.5 MG tablet, Take 1 tablet by mouth 2 (Two) Times a Day As Needed for Anxiety., Disp: 30  "tablet, Rfl: 2    budesonide-formoterol (Symbicort) 80-4.5 MCG/ACT inhaler, Inhale 2 puffs 2 (Two) Times a Day., Disp: 3 each, Rfl: 1    busPIRone (BUSPAR) 10 MG tablet, Take 1 tablet by mouth 3 (Three) Times a Day As Needed (anxiety)., Disp: 270 tablet, Rfl: 1    FLUoxetine (PROzac) 20 MG capsule, Take 1 capsule by mouth Daily., Disp: 90 capsule, Rfl: 3    hydroCHLOROthiazide 25 MG tablet, Take 1 tablet by mouth Daily., Disp: 90 tablet, Rfl: 3    lisinopril (PRINIVIL,ZESTRIL) 20 MG tablet, TAKE 1 TABLET BY MOUTH DAILY, Disp: 90 tablet, Rfl: 3    Multiple Vitamin (DAILY VITAMIN) tablet, Take 1 tablet by mouth Daily., Disp: , Rfl:     SEMAGLUTIDE PO, Take 40-45 Units by mouth 1 (One) Time Per Week. From [facility], Disp: , Rfl:     amoxicillin-clavulanate (AUGMENTIN) 875-125 MG per tablet, Take 1 tablet by mouth 2 (Two) Times a Day for 7 days., Disp: 14 tablet, Rfl: 0    fluticasone (FLONASE) 50 MCG/ACT nasal spray, Administer 2 sprays into the nostril(s) as directed by provider Daily., Disp: 18.2 g, Rfl: 0    ondansetron (Zofran) 4 MG tablet, Take 1 tablet by mouth Every 8 (Eight) Hours As Needed for Nausea or Vomiting. (Patient not taking: Reported on 11/18/2024), Disp: 15 tablet, Rfl: 0    promethazine-dextromethorphan (PROMETHAZINE-DM) 6.25-15 MG/5ML syrup, Take 5 mL by mouth 4 (Four) Times a Day As Needed for Cough., Disp: 180 mL, Rfl: 0    Allergies:   Allergies   Allergen Reactions    Erythromycin        Objective     Vital Signs  /92 (BP Location: Left arm, Patient Position: Sitting, Cuff Size: Adult)   Pulse 98   Temp (!) 100.7 °F (38.2 °C) (Temporal)   Ht 162.6 cm (64\")   Wt 74.7 kg (164 lb 9.6 oz)   SpO2 96%   BMI 28.25 kg/m²   Body mass index is 28.25 kg/m².            Physical Exam  Constitutional:       Appearance: Normal appearance.   HENT:      Head: Normocephalic and atraumatic.   Eyes:      Extraocular Movements: Extraocular movements intact.      " Conjunctiva/sclera: Conjunctivae normal.   Pulmonary:      Effort: Pulmonary effort is normal. No respiratory distress.      Breath sounds: Normal breath sounds.   Skin:     General: Skin is warm and dry.   Neurological:      General: No focal deficit present.      Mental Status: She is alert and oriented to person, place, and time.   Psychiatric:         Mood and Affect: Mood normal.         Behavior: Behavior normal.          Results:  Recent Results (from the past 24 hours)   POCT SARS-CoV-2 + Flu Antigen LESLEY    Collection Time: 11/18/24  1:14 PM    Specimen: Swab   Result Value Ref Range    SARS Antigen Not Detected Not Detected, Presumptive Negative    Influenza A Antigen LESLEY Not Detected Not Detected    Influenza B Antigen LESLEY Not Detected Not Detected    Internal Control Passed Passed    Lot Number [id number]     Expiration Date 9,042,025         Assessment and Plan     Assessment/Plan:  Diagnoses and all orders for this visit:    1. Acute cough (Primary)  -     POCT SARS-CoV-2 + Flu Antigen LESLEY    2. Bronchitis  -     promethazine-dextromethorphan (PROMETHAZINE-DM) 6.25-15 MG/5ML syrup; Take 5 mL by mouth 4 (Four) Times a Day As Needed for Cough.  Dispense: 180 mL; Refill: 0  -     amoxicillin-clavulanate (AUGMENTIN) 875-125 MG per tablet; Take 1 tablet by mouth 2 (Two) Times a Day for 7 days.  Dispense: 14 tablet; Refill: 0  -     fluticasone (FLONASE) 50 MCG/ACT nasal spray; Administer 2 sprays into the nostril(s) as directed by provider Daily.  Dispense: 18.2 g; Refill: 0      Assessment & Plan  1. Upper respiratory infection/bronchitis.  Her influenza and COVID-19 tests returned negative results. The physical examination did not reveal any signs of pneumonia. She was advised to use her albuterol inhaler every 4 hours as needed. A prescription for Augmentin was provided for a duration of 7 days. Continuation of Mucinex DM twice daily was recommended. Flonase was prescribed for congestion and postnasal  drip. A cough syrup was also prescribed. Should she experience significant difficulty in breathing, a chest x-ray will be considered.        Follow Up  No follow-ups on file.    Patient or patient representative verbalized consent for the use of Ambient Listening during the visit with  Kim Alfaro MD for chart documentation. 11/18/2024  13:39 EST      Kim Alfaro MD   [facility] [place] [id number]

## 2024-11-18 NOTE — E-VISIT ESCALATED
Date: 2024 09:18:18  Clinician: Em Schroeder  Clinician NPI: [id number]  Patient: Shannan Sharp  Patient : 1985  Patient Address: [address] [place] [address]  Patient Phone: [phone]  Visit Protocol: URI  Patient Summary:  Shannan is a 39 year old ( : 1985 ) female who initiated a visit for cold, sinus infection, or influenza.     Shannan states the symptoms started 1-2 days ago.   Symptom start date: 11/15/2024   The symptoms consist of a headache,   myalgia, nasal congestion, nausea, a cough, facial pain or pressure, wheezing, malaise, a sore throat, chills, and rhinitis. Shannan is experiencing difficulty breathing due to nasal congestion but is not short of breath. Shannan also feels feverish but was   unable to measure temperature.   Symptom details     Nasal secretions: The color of the mucus is clear.    Cough: Shannan coughs a few times an hour and the cough is more bothersome at night. Phlegm comes into the throat when coughing. Shannan believes the cough is caused by post-nasal drip. The color of the phlegm is yellow.     Sore throat: Shannan reports having moderate throat pain (4-6 on a 10 point pain scale), does not have exudate on the tonsils, and can swallow liquids without any difficulty. Shannan is not sure if the lymph nodes in the neck are enlarged. A rash has not   appeared on the skin since the sore throat started.     Wheezing: Wheezing impacts daily activities sometimes.     Facial pain or pressure: The facial pain or pressure does not feel worse when bending or leaning forward.     Headache: The headache is mild (1-3 on a 10 point pain scale).      Shannan denies having teeth pain, ageusia, anosmia, ear pain, vomiting, and diarrhea. Shannan also denies having recent facial or sinus surgery in the past 60 days, having a sinus infection within the past year, and taking antibiotic medication in the past   month.   Precipitating events  Within the past week, Shannan has not  been exposed to someone with strep throat. Shannan has not recently been exposed to someone with influenza. Shannan has been in close contact with the following high risk individuals: adults   65 or older and children under the age of 5.    Shannan has not received the influenza vaccination.   Pertinent COVID-19 (Coronavirus) information  Since the symptoms started, Shannan has not tested for COVID-19.   Shannan has not had COVID-19 in the last 3   months.   Shannan has not received a COVID-19 vaccine in the past year.   Pertinent medical history     A provider has not told Shannan to avoid NSAIDs.   Shannan does not get yeast infections when an antibiotic is taken.   Shannan does not have diabetes. Shannan   denies having immunosuppressive conditions (e.g., chemotherapy, HIV, organ transplant, long-term use of steroids or other immunosuppressive medications, splenectomy). Shannan does not have asthma.   Shannan denies having chronic lung disease, cystic   fibrosis, hypertension, long-term disabilities, mental health conditions, sickle cell disease or thalassemia, stroke or other cardiovascular disease, substance use disorders, or tuberculosis (TB).  hSannan does not smoke or use smokeless tobacco. Shannan   does not vape or use other e-cigarette products.   Shannan denies pregnancy and denies breastfeeding.   Additional information as reported by the patient (free text): I tend to get strep throat or some sort of respiratory infection this time of year almost   every year, and my provider prescribes an antibiotic for me. I'm taking Mucinex to loosen up the congestion in my chest, but would like an antibiotic to fight any infection.   Weight: 162 lbs (73.48 kg)    MEDICATIONS: hydrochlorothiazide oral, dicyclomine intramuscular, alprazolam oral, lisinopril oral, budesonide-formoterol inhalation, buspirone oral, piperacillin-tazobactam intravenous, doxycycline hyclate oral, nitrofurantoin monohydrate/macrocrystals   oral, albuterol sulfate  inhalation, fluoxetine oral, ketorolac injection, ALLERGIES: erythromycin base  Clinician Response:  Dear Shannan,  I am sorry you are not feeling well. To determine the most appropriate care for you, I would like you to be seen in person to further discuss your health history and symptoms.  You will not be charged for this visit.   Thank you for trusting us with your care.  For the latest updates on COVID-19 (Coronavirus), please visit the Centers for Disease Control and Prevention (CDC). Also, your state and local health department websites may provide additional guidance   regarding testing and isolation recommendations for your location.   Diagnosis: Refer for additional evaluation  Diagnosis ICD: R69  Additional Clinician Notes:  May do video visit

## 2024-11-25 RX ORDER — NORGESTIMATE AND ETHINYL ESTRADIOL 7DAYSX3 28
1 KIT ORAL DAILY
Qty: 84 TABLET | Refills: 3 | OUTPATIENT
Start: 2024-11-25

## 2024-11-25 NOTE — TELEPHONE ENCOUNTER
When I recently saw her she told me she was stopping her birth control and plans for trying to conceive.  Call her and make sure she is in fact not wanting refills on birth control.  Thank you

## 2025-04-14 NOTE — TELEPHONE ENCOUNTER
Called pt with no answer or voicemail. Will send portal.    Rate-controlled  Maintained on warfarin 5 mg q day & Coreg   INR supratherapeutic, hold warfarin on 04/14    Recent Labs     04/13/25  1657 04/14/25  0732   INR 3.71* 3.96*

## 2025-07-30 ENCOUNTER — OFFICE VISIT (OUTPATIENT)
Dept: INTERNAL MEDICINE | Facility: CLINIC | Age: 40
End: 2025-07-30
Payer: COMMERCIAL

## 2025-07-30 VITALS
WEIGHT: 175 LBS | TEMPERATURE: 97.5 F | SYSTOLIC BLOOD PRESSURE: 138 MMHG | HEART RATE: 80 BPM | DIASTOLIC BLOOD PRESSURE: 92 MMHG | BODY MASS INDEX: 29.88 KG/M2 | HEIGHT: 64 IN

## 2025-07-30 DIAGNOSIS — F41.8 SITUATIONAL ANXIETY: ICD-10-CM

## 2025-07-30 DIAGNOSIS — L98.9 LESION OF SKIN OF NOSE: ICD-10-CM

## 2025-07-30 DIAGNOSIS — E55.9 VITAMIN D DEFICIENCY: ICD-10-CM

## 2025-07-30 DIAGNOSIS — F41.1 GAD (GENERALIZED ANXIETY DISORDER): ICD-10-CM

## 2025-07-30 DIAGNOSIS — Z13.29 SCREENING FOR ENDOCRINE DISORDER: ICD-10-CM

## 2025-07-30 DIAGNOSIS — Z00.00 ROUTINE MEDICAL EXAM: Primary | ICD-10-CM

## 2025-07-30 DIAGNOSIS — I10 ESSENTIAL HYPERTENSION: ICD-10-CM

## 2025-07-30 DIAGNOSIS — Z79.899 HIGH RISK MEDICATION USE: ICD-10-CM

## 2025-07-30 PROCEDURE — 99395 PREV VISIT EST AGE 18-39: CPT | Performed by: NURSE PRACTITIONER

## 2025-07-30 RX ORDER — ALPRAZOLAM 0.5 MG
0.5 TABLET ORAL 2 TIMES DAILY PRN
Qty: 15 TABLET | Refills: 1 | Status: SHIPPED | OUTPATIENT
Start: 2025-07-30

## 2025-07-30 RX ORDER — BUSPIRONE HYDROCHLORIDE 10 MG/1
10 TABLET ORAL 3 TIMES DAILY PRN
Qty: 270 TABLET | Refills: 1 | Status: SHIPPED | OUTPATIENT
Start: 2025-07-30

## 2025-07-30 NOTE — PROGRESS NOTES
Shannan Sharp  1985  [id number]  Patient Care Team:  Pamella Andujar APRN as PCP - General (Internal Medicine)  Sudheer Hamlin MD as Obstetrician (Obstetrics and Gynecology)  Snow Zepeda MD as Consulting Physician (Hematology and Oncology)    Shannan Sharp is a 40 y.o. pleasant female here today for annual physical.  Chief Complaint   Patient presents with    Annual Exam       HPI:   History of Present Illness  Shannan is a pleasant female here for a physical. She has a pertinent medical history including hypertension and anxiety.     She reports feeling well overall. She has been on compound semaglutide since 2023, with a brief discontinuation of about 1.5 months. In 01/2025, she tried tirzepatide but did not achieve the desired results, leading her to resume semaglutide. This medication, compounded with B12, has helped her maintain most of her weight loss. She had previously lost 20 pounds but regained 5, bringing her current weight to 175 pounds. Her goal is to reach 150 pounds. She exercises 2 to 3 times a week at [employer] and uses Symbicort 1 puff before workouts. She has not been ill recently.    She has discontinued her birth control as she is trying to conceive. She has been actively trying to get pregnant for approximately 5 months and has never been pregnant before. She was currently taking prenatal vitamins with folic acid or MVI with folic acid but has not been taking them regularly for the last several months.  She sees Dr. Hamlin for gynecology.     She has a small red spot on her nose that has been present for about a year. Despite applying vitamin C serum, there has been no improvement. She has a history of sunburns from her youth but now regularly uses sunscreen and wears hats.    She has stopped taking lisinopril 20 mg and hydrochlorothiazide 25 mg about 7 or 8 months ago. She was monitoring her blood pressure at home initially, which was generally within  the normal range, although occasionally slightly elevated. She reports no leg swelling, hand swelling, chest pain, or shortness of breath. She last checked her blood pressure in the spring.  It is high in the office today.    Her mother was diagnosed with liver cancer about a week and a half ago, which has led to some additional increased anxiety lately. She still takes fluoxetine 20 mg every day and buspirone 10 mg once a day every day and then an as-needed dose, and Xanax only rarely as needed. She takes Xanax maybe twice a month.         Past Medical History:   Diagnosis Date    Essential hypertension 2019    Mixed hyperlipidemia 2019    SIRS (systemic inflammatory response syndrome) 06/14/2023    Situational anxiety 2022    Urinary tract infection June 2023     Past Surgical History:   Procedure Laterality Date    LAPAROSCOPIC CHOLECYSTECTOMY  2009    LASIK Bilateral 07/2023     Family History   Problem Relation Age of Onset    Hypertension Father     Diabetes Mother     Hypertension Mother     Cancer Mother         Liver Cancer (july 2025)    Liver disease Mother         Non alcoholic fatty liver    Lung cancer Paternal Grandfather     Coronary artery disease Maternal Grandmother     Alzheimer's disease Maternal Grandmother     Diabetes Maternal Grandmother      Social History     Tobacco Use   Smoking Status Never   Smokeless Tobacco Never     Allergies   Allergen Reactions    Erythromycin        Current Outpatient Medications:     ALPRAZolam (XANAX) 0.5 MG tablet, Take 1 tablet by mouth 2 (Two) Times a Day As Needed for Anxiety., Disp: 15 tablet, Rfl: 1    budesonide-formoterol (Symbicort) 80-4.5 MCG/ACT inhaler, Inhale 2 puffs 2 (Two) Times a Day., Disp: 3 each, Rfl: 1    busPIRone (BUSPAR) 10 MG tablet, Take 1 tablet by mouth 3 (Three) Times a Day As Needed (anxiety)., Disp: 270 tablet, Rfl: 1    FLUoxetine (PROzac) 20 MG capsule, Take 1 capsule by mouth Daily., Disp: 90 capsule, Rfl: 3     "hydroCHLOROthiazide 25 MG tablet, Take 1 tablet by mouth Daily., Disp: 90 tablet, Rfl: 3    Multiple Vitamin (DAILY VITAMIN) tablet, Take 1 tablet by mouth Daily., Disp: , Rfl:     SEMAGLUTIDE PO, Take 40-45 Units by mouth 1 (One) Time Per Week. From [facility] in [facility], Disp: , Rfl:       Review of systems was completed, and pertinent findings are noted in the HPI.    /92   Pulse 80   Temp 97.5 °F (36.4 °C) (Temporal)   Ht 163 cm (64.17\")   Wt 79.4 kg (175 lb)   BMI 29.88 kg/m²     Physical Exam  Vitals reviewed.   Constitutional:       Appearance: Normal appearance. She is well-developed and overweight.   HENT:      Head: Normocephalic and atraumatic.      Comments: 1 mm uniformly red macule tip of nose     Right Ear: External ear normal.      Left Ear: External ear normal.   Eyes:      Conjunctiva/sclera: Conjunctivae normal.      Pupils: Pupils are equal, round, and reactive to light.   Cardiovascular:      Rate and Rhythm: Normal rate and regular rhythm.      Pulses: Normal pulses.      Heart sounds: Normal heart sounds.   Pulmonary:      Effort: Pulmonary effort is normal.      Breath sounds: Normal breath sounds.   Abdominal:      General: Abdomen is flat. Bowel sounds are normal.      Palpations: Abdomen is soft.   Musculoskeletal:         General: Normal range of motion.      Cervical back: Normal range of motion and neck supple.   Skin:     General: Skin is warm and dry.   Neurological:      Mental Status: She is alert and oriented to person, place, and time.   Psychiatric:         Mood and Affect: Mood normal.         Behavior: Behavior normal.         Thought Content: Thought content normal.         Judgment: Judgment normal.         PHQ-9 Total Score:           Assessment/Plan:  Diagnoses and all orders for this visit:    1. Routine medical exam (Primary)    2. Essential hypertension  -     CBC & Differential; Future  -     Comprehensive Metabolic Panel; " Future  -     Lipid Panel; Future  -     Microalbumin / Creatinine Urine Ratio - Urine, Clean Catch; Future    3. KOJO (generalized anxiety disorder)  Comments:  continue prozac& buspar dialy and alprazolam prn, get UDS today  Orders:  -     ALPRAZolam (XANAX) 0.5 MG tablet; Take 1 tablet by mouth 2 (Two) Times a Day As Needed for Anxiety.  Dispense: 15 tablet; Refill: 1  -     busPIRone (BUSPAR) 10 MG tablet; Take 1 tablet by mouth 3 (Three) Times a Day As Needed (anxiety).  Dispense: 270 tablet; Refill: 1  -     FLUoxetine (PROzac) 20 MG capsule; Take 1 capsule by mouth Daily.  Dispense: 90 capsule; Refill: 3    4. Screening for endocrine disorder  -     TSH Rfx On Abnormal To Free T4; Future    5. Vitamin D deficiency  -     Vitamin D,25-Hydroxy; Future    6. Situational anxiety    7. High risk medication use  -     Urine Drug Screen - Urine, Clean Catch; Future    8. Lesion of skin of nose  -     Ambulatory Referral to Dermatology       Assessment & Plan  1. Annual physical examination.  - She is advised to receive the COVID-19 and influenza vaccines in the fall.    2. Anxiety.  - Continues to take fluoxetine 20 mg daily and buspirone 10 mg daily with an additional as-needed dose.  - Xanax is used rarely, approximately twice a month.    3. Hypertension.  - Will monitor blood pressure daily for the next week and report the readings.  - If readings remain high, will restart hydrochlorothiazide at half the usual dose (12.5 mg) and ensure adequate hydration.  Do not restart lisinopril since trying to conceive.    4. Weight management.  - Currently taking semaglutide compounded with B12 and has resumed this medication after a brief hiatus.  - Engaging in regular exercise, attending [employer] classes 2-3 times a week.  - Aware that she is to stop this medication if she becomes pregnant.    5. Asthma.  - Uses Symbicort as needed, primarily before exercise.  - Increase Symbicort to 2 puffs twice daily for increased  allergy symptoms or respiratory infections.    6. Skin lesion (nose).  - Referral to [facility] has been made for further evaluation .    7. Preconception counseling.  - Advised to continue taking prenatal vitamins with folic acid (at least 400 mcg daily) and to inform us immediately upon confirmation of pregnancy. Call GYN or PCP if you become pregnant to discuss continuation of regular medications.        Patient Instructions   Preventive Care 21-39 Years Old, Female  Preventive care refers to lifestyle choices and visits with your health care provider that can promote health and wellness. Preventive care visits are also called wellness exams.  What can I expect for my preventive care visit?  Counseling  During your preventive care visit, your health care provider may ask about your:  Medical history, including:  Past medical problems.  Family medical history.  Pregnancy history.  Current health, including:  Menstrual cycle.  Method of birth control.  Emotional well-being.  Home life and relationship well-being.  Sexual activity and sexual health.  Lifestyle, including:  Alcohol, nicotine or tobacco, and drug use.  Access to firearms.  Diet, exercise, and sleep habits.  Work and work environment.  Sunscreen use.  Safety issues such as seatbelt and bike helmet use.  Physical exam  Your health care provider may check your:  Height and weight. These may be used to calculate your BMI (body mass index). BMI is a measurement that tells if you are at a healthy weight.  Waist circumference. This measures the distance around your waistline. This measurement also tells if you are at a healthy weight and may help predict your risk of certain diseases, such as type 2 diabetes and high blood pressure.  Heart rate and blood pressure.  Body temperature.  Skin for abnormal spots.  What immunizations do I need?    Vaccines are usually given at various ages, according to a schedule. Your health care provider  will recommend vaccines for you based on your age, medical history, and lifestyle or other factors, such as travel or where you work.  What tests do I need?  Screening  Your health care provider may recommend screening tests for certain conditions. This may include:  Pelvic exam and Pap test.  Lipid and cholesterol levels.  Diabetes screening. This is done by checking your blood sugar (glucose) after you have not eaten for a while (fasting).  Hepatitis B test.  Hepatitis C test.  HIV (human immunodeficiency virus) test.  STI (sexually transmitted infection) testing, if you are at risk.  BRCA-related cancer screening. This may be done if you have a family history of breast, ovarian, tubal, or peritoneal cancers.  Talk with your health care provider about your test results, treatment options, and if necessary, the need for more tests.  Follow these instructions at home:  Eating and drinking    Eat a healthy diet that includes fresh fruits and vegetables, whole grains, lean protein, and low-fat dairy products.  Take vitamin and mineral supplements as recommended by your health care provider.  Do not drink alcohol if:  Your health care provider tells you not to drink.  You are pregnant, may be pregnant, or are planning to become pregnant.  If you drink alcohol:  Limit how much you have to 0-1 drink a day.  Know how much alcohol is in your drink. In the [place] one drink equals one 12 oz bottle of beer (355 mL), one 5 oz glass of wine (148 mL), or one 1½ oz glass of hard liquor (44 mL).  Lifestyle  Brush your teeth every morning and night with fluoride toothpaste. Floss one time each day.  Exercise for at least 30 minutes 5 or more days each week.  Do not use any products that contain nicotine or tobacco. These products include cigarettes, chewing tobacco, and vaping devices, such as e-cigarettes. If you need help quitting, ask your health care provider.  Do not use drugs.  If you are sexually active, practice safe sex.  Use a condom or other form of protection to prevent STIs.  If you do not wish to become pregnant, use a form of birth control. If you plan to become pregnant, see your health care provider for a prepregnancy visit.  Find healthy ways to manage stress, such as:  Meditation, yoga, or listening to music.  Journaling.  Talking to a trusted person.  Spending time with friends and family.  Minimize exposure to UV radiation to reduce your risk of skin cancer.  Safety  Always wear your seat belt while driving or riding in a vehicle.  Do not drive:  If you have been drinking alcohol. Do not ride with someone who has been drinking.  If you have been using any mind-altering substances or drugs.  While texting.  When you are tired or distracted.  Wear a helmet and other protective equipment during sports activities.  If you have firearms in your house, make sure you follow all gun safety procedures.  Seek help if you have been physically or sexually abused.  What's next?  Go to your health care provider once a year for an annual wellness visit.  Ask your health care provider how often you should have your eyes and teeth checked.  Stay up to date on all vaccines.  This information is not intended to replace advice given to you by your health care provider. Make sure you discuss any questions you have with your health care provider.  Document Revised: 06/15/2022 Document Reviewed: 06/15/2022  [employer] Patient Education © 2024 [employer]    Counseling/Anticipatory Guidance:   Plan of care reviewed with patient at the conclusion of today's visit. Education was provided in regards to diagnosis, diet and exercise, cervical cancer screening, self breast exams, breast cancer screening, and the importance of yearly mammograms.   Nutrition, family planning/contraception, physical activity, healthy weight,ways to reduce stress, adequate sleep, injury prevention, misuse of tobacco, alcohol and drugs, sexual behavior and STD's, dental  health, mental health, and immunizations.    Management and any prescribed or recommended OTC medications.  Patient verbalizes understanding of and agreement with management plan.    Return in about 3 months (around 10/30/2025) for Recheck BP.    Dictated Utilizing Dragon Dictation.      HUMZA Diaz  Patient or patient representative verbalized consent for the use of Ambient Listening during the visit with  HUMZA Diaz for chart documentation. 7/30/2025  15:21 EDT

## 2025-08-11 DIAGNOSIS — I10 ESSENTIAL HYPERTENSION: ICD-10-CM

## 2025-08-11 RX ORDER — HYDROCHLOROTHIAZIDE 25 MG/1
25 TABLET ORAL DAILY
Qty: 90 TABLET | Refills: 3 | Status: SHIPPED | OUTPATIENT
Start: 2025-08-11